# Patient Record
Sex: FEMALE | Race: WHITE | Employment: UNEMPLOYED | URBAN - METROPOLITAN AREA
[De-identification: names, ages, dates, MRNs, and addresses within clinical notes are randomized per-mention and may not be internally consistent; named-entity substitution may affect disease eponyms.]

---

## 2017-12-04 LAB
BLD GP AB SCN SERPL QL: NORMAL
HBV SURFACE AG SERPL QL IA: NEGATIVE
HIV 1+2 AB+HIV1 P24 AG SERPL QL IA: NEGATIVE
RUBELLA ANTIBODY IGG QUANTITATIVE: NORMAL IU/ML
T PALLIDUM IGG SER QL: NEGATIVE

## 2018-05-10 ENCOUNTER — ALLIED HEALTH/NURSE VISIT (OUTPATIENT)
Dept: EDUCATION SERVICES | Facility: CLINIC | Age: 38
End: 2018-05-10
Payer: COMMERCIAL

## 2018-05-10 DIAGNOSIS — O24.419 GESTATIONAL DIABETES: Primary | ICD-10-CM

## 2018-05-10 PROCEDURE — G0109 DIAB MANAGE TRN IND/GROUP: HCPCS

## 2018-05-10 NOTE — PROGRESS NOTES
Diabetes Self-Management Training - Gestational Diabetes    SUBJECTIVE/OBJECTIVE:  Juanita Quiroga presents today for education related to gestational diabetes.  She is accompanied by self    Patient's gestational diabetes management related comments/concerns: how big the baby is going to get    Patient's emotional response to diabetes: expresses readiness to learn    There were no vitals taken for this visit.    Pre pregnancy weight: 150#    Weight gain 15 lbs at 30 weeks gestation.    Estimated Date of Delivery: Data Unavailable    No results found for: GLC    3 hour OGTT: Fastin; 1 hr: 185; 2 hr: 199, 3 hr: 118 on 18    History   Smoking Status     Never Smoker   Smokeless Tobacco     Never Used       Lifestyle and Health Behaviors:  Physical Activity: minimal exercise. Walking 15 min per day.     Nutrition:  Patient eats 2 meals and 2 snacks per day.    Breakfast:  Eggs or cereal or fruit and granola bar  Snack:  Nuts or fruit  Lunch:  nothing  Snack:  nothing  Dinner:  Salad and sweet potatoes and rice  Bedtime Snack:  Tea with dried fruit    Other time(s) food is eaten? No       Beverages: soda sometimes, 1/2 c juice, milk, water    Cultural/Zoroastrian diet restrictions: No     Biggest challenge to healthy eating is:  knowing what to eat    Pre- Vitamin: Yes    Supplements: No   Experiencing nausea?  No     Socio/Economic considerations:  Support System: spouse/significant other    Health Beliefs and Attitudes:   Stage of Change: ACTION (Actively working towards change)    ASSESSMENT:  Needs assistance with meal plan and BG monitoring    INTERVENTION:  Patient was instructed on Contour Next EZ meter and was able to provide an accurate return demonstration. Patient's blood glucose reading today was 83 mg/dL.    Educational topics covered today:  GDM diagnosis, pathophysiology, Risks and Complications of GDM, Means of controlling GDM, Using a Blood Glucose Monitor, Blood Glucose Goals, Logging and  Interpreting Glucose Results, Ketone Testing, When to Call a Diabetes Educator or OB Provider, Healthy Eating During Pregnancy, Counting Carbohydrates, Meal Planning for GDM, and Physical Activity    Educational materials provided today:   [facility] Understanding Gestational Diabetes  GDM Log Book  Sharps Disposal  Care After Delivery  Contour Next EZ meter kit    Pt verbalized understanding of concepts discussed and recommendations provided today.     PLAN:  Check glucose 4 times daily, before breakfast and 1 hour after each meal.     Check Ketones daily for one week, if negative, reduce testing to once a week.     Physical activity recommended: as able.    Meal plan: 2-3 carbs at breakfast, 3-4 carbs at lunch, 3-4 carbs at supper, 1-2 carbs at 3 snacks a day.  Follow consistent CHO meal plan, eat CHO and protein/fat at all meals/snacks.    Call/e-mail/MyChart message diabetes educator if 3 or more blood sugars are above the goal in 1 week or if ketones are positive.     Call/e-mail/MyChart message with questions/concerns.    FOLLOW-UP:  Call or e-mail educator if 3 or more blood sugars are above goal in 1 week.  Call or e-mail with questions or concerns.  Appointment scheduled on 5/16.     Shaylee Mendez RD LD CDE    Time Spent: 120 minutes  Encounter type: Group class

## 2018-05-10 NOTE — MR AVS SNAPSHOT
"              After Visit Summary   5/10/2018    Juanita Quiroga    MRN: [id number]           Patient Information     Date Of Birth          1980        Visit Information        Provider Department      5/10/2018 1:00 PM CS GDM [facility]        Today's Diagnoses     Gestational diabetes    -  1       Follow-ups after your visit        Your next 10 appointments already scheduled     May 16, 2018 12:30 PM CDT   Diabetic Education with CS ENDO DIABETES ED RESOURCE   [facility] ([facility])    [address] [place] [id number] [phone]              Who to contact     If you have questions or need follow up information about today's clinic visit or your schedule please contact [facility] directly at [phone].  Normal or non-critical lab and imaging results will be communicated to you by MyChart, letter or phone within 4 business days after the clinic has received the results. If you do not hear from us within 7 days, please contact the clinic through MyChart or phone. If you have a critical or abnormal lab result, we will notify you by phone as soon as possible.  Submit refill requests through [employer] or call your pharmacy and they will forward the refill request to us. Please allow 3 business days for your refill to be completed.          Additional Information About Your Visit        [facility] Information     [employer] lets you send messages to your doctor, view your test results, renew your prescriptions, schedule appointments and more. To sign up, go to [email or web address] . Click on \"Log in\" on the left side of the screen, which will take you to the Welcome page. Then click on \"Sign up Now\" on the right side of the page.     You will be asked to enter the access code listed below, as well as some personal information. Please follow the directions to create your username and password.     Your access " code is: [email or web address]  Expires: 2018  3:40 PM     Your access code will  in 90 days. If you need help or a new code, please call your [place] clinic or [phone].        Care EveryWhere ID     This is your Care EveryWhere ID. This could be used by other organizations to access your [place] medical records  [id number]         Blood Pressure from Last 3 Encounters:   16 93/47   16 113/74    Weight from Last 3 Encounters:   16 78.9 kg (174 lb)              We Performed the Following     DIABETES EDUCATION - Group []           Today's Medication Changes          These changes are accurate as of 5/10/18  3:40 PM.  If you have any questions, ask your nurse or doctor.               Start taking these medicines.        Dose/Directions    acetone (Urine) test Strp   Used for:  Gestational diabetes        Test daily. When negative for 1 week can reduce testing to once weekly   Quantity:  25 each   Refills:  1       blood glucose monitoring lancets   Used for:  Gestational diabetes        Use to test blood sugar 4 times daily or as directed.  Ok to substitute alternative if insurance prefers.   Quantity:  100 each   Refills:  3       blood glucose monitoring meter device kit   Used for:  Gestational diabetes        Use to test blood sugar 4 times daily or as directed.  Ok to substitute alternative if insurance prefers.   Quantity:  1 kit   Refills:  0       blood glucose monitoring test strip   Commonly known as:  CONTOUR NEXT TEST   Used for:  Gestational diabetes        Use to test blood sugar 4 times daily or as directed.   Quantity:  150 each   Refills:  3            Where to get your medicines      These medications were sent to [facility] [address] [place] [address] [address]  AT [address] [facility] [address]     Phone:  [phone]     acetone (Urine) test Strp    blood glucose monitoring lancets    blood glucose  monitoring test strip         Some of these will need a paper prescription and others can be bought over the counter.  Ask your nurse if you have questions.     You don't need a prescription for these medications     blood glucose monitoring meter device kit                Primary Care Provider Office Phone # Fax #    ADRIA Hanna [phone]       [facility] [address]  [facility] [address]        Equal Access to Services     BASIL SORENSON : Hadii aad ku hadasho Soomaali, waaxda luqadaha, qaybta kaalmada adeegyada, waxay idiin hayaan adeeg kharash la'aan . [facility] [phone].    ATENCIÓN: Si habla español, tiene a miller disposición servicios gratuitos de asistencia lingüística. [facility] [phone].    We comply with applicable federal civil rights laws and [place] laws. We do not discriminate on the basis of race, color, national origin, age, disability, sex, sexual orientation, or gender identity.            Thank you!     Thank you for choosing [facility]  for your care. Our goal is always to provide you with excellent care. Hearing back from our patients is one way we can continue to improve our services. Please take a few minutes to complete the written survey that you may receive in the mail after your visit with us. Thank you!             Your Updated Medication List - Protect others around you: Learn how to safely use, store and throw away your medicines at www.disposemymeds.org.          This list is accurate as of 5/10/18  3:40 PM.  Always use your most recent med list.                   Brand Name Dispense Instructions for use Diagnosis    acetaminophen 325 MG tablet    TYLENOL    100 tablet    Take 2 tablets (650 mg) by mouth every 4 hours as needed for mild pain or fever (greater than or equal to 38? C /100.4? F (oral) or 38.5? C/ 101.4? F (core).)    Indication for care in labor or delivery       acetone (Urine) test Strp      25 each    Test daily. When negative for 1 week can reduce testing to once weekly    Gestational diabetes       blood glucose monitoring lancets     100 each    Use to test blood sugar 4 times daily or as directed.  Ok to substitute alternative if insurance prefers.    Gestational diabetes       blood glucose monitoring meter device kit     1 kit    Use to test blood sugar 4 times daily or as directed.  Ok to substitute alternative if insurance prefers.    Gestational diabetes       blood glucose monitoring test strip    CONTOUR NEXT TEST    150 each    Use to test blood sugar 4 times daily or as directed.    Gestational diabetes       calcium carbonate 500 MG chewable tablet    TUMS     Take 1 chew tab by mouth 3 times daily        ferrous sulfate 142 (45 Fe) MG Tbcr    SLO-FE    30 tablet    Take 1 tablet (142 mg) by mouth daily    Indication for care in labor or delivery       ibuprofen 200 MG tablet    ADVIL/MOTRIN     Take 3 tablets (600 mg) by mouth every 6 hours as needed for other (cramping)    Indication for care in labor or delivery

## 2018-05-14 ENCOUNTER — HOSPITAL ENCOUNTER (EMERGENCY)
Facility: CLINIC | Age: 38
Discharge: HOME | End: 2018-05-14
Attending: EMERGENCY MEDICINE | Admitting: EMERGENCY MEDICINE
Payer: COMMERCIAL

## 2018-05-14 ENCOUNTER — APPOINTMENT (OUTPATIENT)
Dept: ULTRASOUND IMAGING | Facility: CLINIC | Age: 38
End: 2018-05-14
Attending: EMERGENCY MEDICINE
Payer: COMMERCIAL

## 2018-05-14 ENCOUNTER — APPOINTMENT (OUTPATIENT)
Dept: GENERAL RADIOLOGY | Facility: CLINIC | Age: 38
End: 2018-05-14
Attending: EMERGENCY MEDICINE
Payer: COMMERCIAL

## 2018-05-14 VITALS
TEMPERATURE: 98.3 F | HEART RATE: 76 BPM | RESPIRATION RATE: 16 BRPM | SYSTOLIC BLOOD PRESSURE: 111 MMHG | OXYGEN SATURATION: 98 % | DIASTOLIC BLOOD PRESSURE: 85 MMHG

## 2018-05-14 DIAGNOSIS — Z33.1 PREGNANCY, INCIDENTAL: ICD-10-CM

## 2018-05-14 DIAGNOSIS — R07.89 CHEST WALL PAIN: ICD-10-CM

## 2018-05-14 LAB
ALBUMIN SERPL-MCNC: 2.8 G/DL (ref 3.4–5)
ALP SERPL-CCNC: 87 U/L (ref 40–150)
ALT SERPL W P-5'-P-CCNC: 19 U/L (ref 0–50)
ANION GAP SERPL CALCULATED.3IONS-SCNC: 8 MMOL/L (ref 3–14)
AST SERPL W P-5'-P-CCNC: 19 U/L (ref 0–45)
BASOPHILS # BLD AUTO: 0 10E9/L (ref 0–0.2)
BASOPHILS NFR BLD AUTO: 0.4 %
BILIRUB SERPL-MCNC: 0.3 MG/DL (ref 0.2–1.3)
BUN SERPL-MCNC: 8 MG/DL (ref 7–30)
CALCIUM SERPL-MCNC: 8.7 MG/DL (ref 8.5–10.1)
CHLORIDE SERPL-SCNC: 106 MMOL/L (ref 94–109)
CO2 SERPL-SCNC: 24 MMOL/L (ref 20–32)
CREAT SERPL-MCNC: 0.53 MG/DL (ref 0.52–1.04)
DEPRECATED S PYO AG THROAT QL EIA: NORMAL
DIFFERENTIAL METHOD BLD: ABNORMAL
EOSINOPHIL # BLD AUTO: 0.1 10E9/L (ref 0–0.7)
EOSINOPHIL NFR BLD AUTO: 1.2 %
ERYTHROCYTE [DISTWIDTH] IN BLOOD BY AUTOMATED COUNT: 11.9 % (ref 10–15)
GFR SERPL CREATININE-BSD FRML MDRD: >90 ML/MIN/1.7M2
GLUCOSE SERPL-MCNC: 74 MG/DL (ref 70–99)
HCT VFR BLD AUTO: 33.7 % (ref 35–47)
HGB BLD-MCNC: 11 G/DL (ref 11.7–15.7)
IMM GRANULOCYTES # BLD: 0.1 10E9/L (ref 0–0.4)
IMM GRANULOCYTES NFR BLD: 0.7 %
INR PPP: 0.9 (ref 0.86–1.14)
LIPASE SERPL-CCNC: 144 U/L (ref 73–393)
LYMPHOCYTES # BLD AUTO: 2.6 10E9/L (ref 0.8–5.3)
LYMPHOCYTES NFR BLD AUTO: 28.9 %
MCH RBC QN AUTO: 29 PG (ref 26.5–33)
MCHC RBC AUTO-ENTMCNC: 32.6 G/DL (ref 31.5–36.5)
MCV RBC AUTO: 89 FL (ref 78–100)
MONOCYTES # BLD AUTO: 0.6 10E9/L (ref 0–1.3)
MONOCYTES NFR BLD AUTO: 6.7 %
NEUTROPHILS # BLD AUTO: 5.7 10E9/L (ref 1.6–8.3)
NEUTROPHILS NFR BLD AUTO: 62.1 %
NRBC # BLD AUTO: 0 10*3/UL
NRBC BLD AUTO-RTO: 0 /100
PLATELET # BLD AUTO: 211 10E9/L (ref 150–450)
POTASSIUM SERPL-SCNC: 3.6 MMOL/L (ref 3.4–5.3)
PROT SERPL-MCNC: 7.2 G/DL (ref 6.8–8.8)
RBC # BLD AUTO: 3.79 10E12/L (ref 3.8–5.2)
SODIUM SERPL-SCNC: 138 MMOL/L (ref 133–144)
SPECIMEN SOURCE: NORMAL
TROPONIN I BLD-MCNC: 0 UG/L (ref 0–0.1)
TROPONIN I BLD-MCNC: 0 UG/L (ref 0–0.1)
WBC # BLD AUTO: 9.1 10E9/L (ref 4–11)

## 2018-05-14 PROCEDURE — 87081 CULTURE SCREEN ONLY: CPT | Performed by: EMERGENCY MEDICINE

## 2018-05-14 PROCEDURE — 93970 EXTREMITY STUDY: CPT

## 2018-05-14 PROCEDURE — 80053 COMPREHEN METABOLIC PANEL: CPT | Performed by: EMERGENCY MEDICINE

## 2018-05-14 PROCEDURE — 85610 PROTHROMBIN TIME: CPT | Performed by: EMERGENCY MEDICINE

## 2018-05-14 PROCEDURE — 83690 ASSAY OF LIPASE: CPT | Performed by: EMERGENCY MEDICINE

## 2018-05-14 PROCEDURE — 84484 ASSAY OF TROPONIN QUANT: CPT

## 2018-05-14 PROCEDURE — 87880 STREP A ASSAY W/OPTIC: CPT | Performed by: EMERGENCY MEDICINE

## 2018-05-14 PROCEDURE — 76705 ECHO EXAM OF ABDOMEN: CPT

## 2018-05-14 PROCEDURE — 99285 EMERGENCY DEPT VISIT HI MDM: CPT | Mod: 25

## 2018-05-14 PROCEDURE — 85025 COMPLETE CBC W/AUTO DIFF WBC: CPT | Performed by: EMERGENCY MEDICINE

## 2018-05-14 PROCEDURE — 71046 X-RAY EXAM CHEST 2 VIEWS: CPT

## 2018-05-14 ASSESSMENT — ENCOUNTER SYMPTOMS
FEVER: 0
SORE THROAT: 1
COUGH: 0

## 2018-05-14 NOTE — ED AVS SNAPSHOT
[facility] Emergency Department    [address]    [facility] [address]    Phone:  [phone]    Fax:  [phone]                                       Juanita Quiroga   MRN: [id number]    Department:  [facility] Emergency Department   Date of Visit:  5/14/2018           After Visit Summary Signature Page     I have received my discharge instructions, and my questions have been answered. I have discussed any challenges I see with this plan with the nurse or doctor.    ..........................................................................................................................................  Patient/Patient Representative Signature      ..........................................................................................................................................  Patient Representative Print Name and Relationship to Patient    ..................................................               ................................................  Date                                            Time    ..........................................................................................................................................  Reviewed by Signature/Title    ...................................................              ..............................................  Date                                                            Time

## 2018-05-14 NOTE — ED AVS SNAPSHOT
[facility] Emergency Department    [address] [place] [address]    Phone:  [phone]    Fax:  [phone]                                       Juanita Quiroga   MRN: [id number]    Department:  [facility] Emergency Department   Date of Visit:  5/14/2018           Patient Information     Date Of Birth          1980        Your diagnoses for this visit were:     Chest wall pain     Pregnancy, incidental        You were seen by Fidencio Barth MD.      Follow-up Information     Follow up with Nicole Henry APRN CNM. Schedule an appointment as soon as possible for a visit in 3 days.    Specialty:  Midwives    Contact information:    [facility]  [address]  [facility] [address]  [phone]          Discharge Instructions       Discharge Instructions  Chest Pain    You have been seen today for chest pain or discomfort.  At this time, your doctor has found no signs that your chest pain is due to a serious or life-threatening condition, (or you have declined more testing and/or admission to the hospital). However, sometimes there is a serious problem that does not show up right away. Your evaluation today may not be complete and you may need further testing and evaluation.     You need to follow-up with your regular doctor within 3 days.    Return to the Emergency Department if:    Your chest pain changes, gets worse, starts to happen more often, or comes with less activity.    You are short of breath.    You get very weak or tired.    You pass out or faint.    You have any new symptoms, like fever, cough, numb legs, or you cough up blood.    You have anything else that worries you.    Until you follow-up with your regular doctor please do the following:    Take one aspirin daily unless you have an allergy or are told not to by your doctor.    If a stress test appointment has been made, go to the appointment.    If you have  questions, contact your regular doctor.    If your doctor today has told you to follow-up with your regular doctor, it is very important that you make an appointment with your clinic and go to the appointment.  If you do not follow-up with your primary doctor, it may result in missing an important development which could result in permanent injury or disability and/or lasting pain.  If there is any problem keeping your appointment, call your doctor or return to the Emergency Department.    If you were given a prescription for medicine here today, be sure to read all of the information (including the package insert) that comes with your prescription.  This will include important information about the medicine, its side effects, and any warnings that you need to know about.  The pharmacist who fills the prescription can provide more information and answer questions you may have about the medicine.  If you have questions or concerns that the pharmacist cannot address, please call or return to the Emergency Department.     Opioid Medication Information    Pain medications are among the most commonly prescribed medicines, so we are including this information for all our patients. If you did not receive pain medication or get a prescription for pain medicine, you can ignore it.     You may have been given a prescription for an opioid (narcotic) pain medicine and/or have received a pain medicine while here in the Emergency Department. These medicines can make you drowsy or impaired. You must not drive, operate dangerous equipment, or engage in any other dangerous activities while taking these medications. If you drive while taking these medications, you could be arrested for DUI, or driving under the influence. Do not drink any alcohol while you are taking these medications.     Opioid pain medications can cause addiction. If you have a history of chemical dependency of any type, you are at a higher risk of becoming  addicted to pain medications.  Only take these prescribed medications to treat your pain when all other options have been tried. Take it for as short a time and as few doses as possible. Store your pain pills in a secure place, as they are frequently stolen and provide a dangerous opportunity for children or visitors in your house to start abusing these powerful medications. We will not replace any lost or stolen medicine.  As soon as your pain is better, you should flush all your remaining medication.     Many prescription pain medications contain Tylenol  (acetaminophen), including Vicodin , Tylenol #3 , Norco , Lortab , and Percocet .  You should not take any extra pills of Tylenol  if you are using these prescription medications or you can get very sick.  Do not ever take more than 3000 mg of acetaminophen in any 24 hour period.    All opioids tend to cause constipation. Drink plenty of water and eat foods that have a lot of fiber, such as fruits, vegetables, prune juice, apple juice and high fiber cereal.  Take a laxative if you don t move your bowels at least every other day. Miralax , Milk of Magnesia, Colace , or Senna  can be used to keep you regular.      Remember that you can always come back to the Emergency Department if you are not able to see your regular doctor in the amount of time listed above, if you get any new symptoms, or if there is anything that worries you.          Your next 10 appointments already scheduled     May 22, 2018 10:00 AM CDT   Diabetic Education with [facility] DIABETES ED RESOURCE   [facility] ([facility])    [address] [place] [id number] [phone]              24 Hour Appointment Hotline       To make an appointment at any [facility], call [phone] ([phone]). If you don't have a family doctor or clinic, we will help you find one. [facility] are conveniently located to serve the needs of you  and your family.             Review of your medicines      Our records show that you are taking the medicines listed below. If these are incorrect, please call your family doctor or clinic.        Dose / Directions Last dose taken    acetaminophen 325 MG tablet   Commonly known as:  TYLENOL   Dose:  650 mg   Quantity:  100 tablet        Take 2 tablets (650 mg) by mouth every 4 hours as needed for mild pain or fever (greater than or equal to 38? C /100.4? F (oral) or 38.5? C/ 101.4? F (core).)   Refills:  0        acetone (Urine) test Strp   Quantity:  25 each        Test daily. When negative for 1 week can reduce testing to once weekly   Refills:  1        blood glucose monitoring lancets   Quantity:  100 each        Use to test blood sugar 4 times daily or as directed.  Ok to substitute alternative if insurance prefers.   Refills:  3        blood glucose monitoring meter device kit   Quantity:  1 kit        Use to test blood sugar 4 times daily or as directed.  Ok to substitute alternative if insurance prefers.   Refills:  0        blood glucose monitoring test strip   Commonly known as:  CONTOUR NEXT TEST   Quantity:  150 each        Use to test blood sugar 4 times daily or as directed.   Refills:  3        calcium carbonate 500 MG chewable tablet   Commonly known as:  TUMS   Dose:  1 chew tab        Take 1 chew tab by mouth 3 times daily   Refills:  0        ferrous sulfate 142 (45 Fe) MG Tbcr   Commonly known as:  SLO-FE   Dose:  142 mg   Quantity:  30 tablet        Take 1 tablet (142 mg) by mouth daily   Refills:  0        ibuprofen 200 MG tablet   Commonly known as:  ADVIL/MOTRIN   Dose:  600 mg        Take 3 tablets (600 mg) by mouth every 6 hours as needed for other (cramping)   Refills:  0                Procedures and tests performed during your visit     Procedure/Test Number of Times Performed    Abdomen US, limited (RUQ only) 1    Beta strep group A culture 1    CBC with platelets differential 1     Chest XR,  PA & LAT 1    Comprehensive metabolic panel 1    EKG 12-lead, tracing only 1    INR 1    Lipase 1    Rapid strep screen 1    Troponin POCT 2    US Lower Extremity Venous Duplex Bilateral 1      Orders Needing Specimen Collection     None      Pending Results     Date and Time Order Name Status Description    5/14/2018 2032 Beta strep group A culture In process     5/14/2018 2015 Chest XR,  PA & LAT Preliminary     5/14/2018 1926 US Lower Extremity Venous Duplex Bilateral Preliminary     5/14/2018 1926 Abdomen US, limited (RUQ only) Preliminary     5/14/2018 1926 EKG 12-lead, tracing only Preliminary             Pending Culture Results     Date and Time Order Name Status Description    5/14/2018 2032 Beta strep group A culture In process             Pending Results Instructions     If you had any lab results that were not finalized at the time of your Discharge, you can call the ED Lab Result RN at [phone]. You will be contacted by this team for any positive Lab results or changes in treatment. The nurses are available 7 days a week from 10A to 6:30P.  You can leave a message 24 hours per day and they will return your call.        Test Results From Your Hospital Stay        5/14/2018  7:41 PM      Component Results     Component Value Ref Range & Units Status    WBC 9.1 4.0 - 11.0 10e9/L Final    RBC Count 3.79 (L) 3.8 - 5.2 10e12/L Final    Hemoglobin 11.0 (L) 11.7 - 15.7 g/dL Final    Hematocrit 33.7 (L) 35.0 - 47.0 % Final    MCV 89 78 - 100 fl Final    MCH 29.0 26.5 - 33.0 pg Final    MCHC 32.6 31.5 - 36.5 g/dL Final    RDW 11.9 10.0 - 15.0 % Final    Platelet Count 211 150 - 450 10e9/L Final    Diff Method Automated Method  Final    % Neutrophils 62.1 % Final    % Lymphocytes 28.9 % Final    % Monocytes 6.7 % Final    % Eosinophils 1.2 % Final    % Basophils 0.4 % Final    % Immature Granulocytes 0.7 % Final    Nucleated RBCs 0 0 /100 Final    Absolute Neutrophil 5.7 1.6 - 8.3 10e9/L Final     Absolute Lymphocytes 2.6 0.8 - 5.3 10e9/L Final    Absolute Monocytes 0.6 0.0 - 1.3 10e9/L Final    Absolute Eosinophils 0.1 0.0 - 0.7 10e9/L Final    Absolute Basophils 0.0 0.0 - 0.2 10e9/L Final    Abs Immature Granulocytes 0.1 0 - 0.4 10e9/L Final    Absolute Nucleated RBC 0.0  Final         5/14/2018  7:51 PM      Component Results     Component Value Ref Range & Units Status    INR 0.90 0.86 - 1.14 Final         5/14/2018  7:59 PM      Component Results     Component Value Ref Range & Units Status    Sodium 138 133 - 144 mmol/L Final    Potassium 3.6 3.4 - 5.3 mmol/L Final    Chloride 106 94 - 109 mmol/L Final    Carbon Dioxide 24 20 - 32 mmol/L Final    Anion Gap 8 3 - 14 mmol/L Final    Glucose 74 70 - 99 mg/dL Final    Urea Nitrogen 8 7 - 30 mg/dL Final    Creatinine 0.53 0.52 - 1.04 mg/dL Final    GFR Estimate >90 >60 mL/min/1.7m2 Final    Non  GFR Calc    GFR Estimate If Black >90 >60 mL/min/1.7m2 Final    African American GFR Calc    Calcium 8.7 8.5 - 10.1 mg/dL Final    Bilirubin Total 0.3 0.2 - 1.3 mg/dL Final    Albumin 2.8 (L) 3.4 - 5.0 g/dL Final    Protein Total 7.2 6.8 - 8.8 g/dL Final    Alkaline Phosphatase 87 40 - 150 U/L Final    ALT 19 0 - 50 U/L Final    AST 19 0 - 45 U/L Final         5/14/2018  7:59 PM      Component Results     Component Value Ref Range & Units Status    Lipase 144 73 - 393 U/L Final         5/14/2018  8:26 PM      Narrative     RIGHT UPPER QUADRANT ULTRASOUND  5/14/2018  8:21 PM    HISTORY: Epigastric abdominal pain, evaluate for gallstones.  Pregnancy.    COMPARISON: None.    FINDINGS:   Gallbladder: Normal with no cholelithiasis, wall thickening or focal  tenderness.     Bile ducts: CHD is normal diameter. No intrahepatic biliary  dilatation.    Liver: Normal.     Pancreas: Not well seen.    Right kidney: Normal.         Impression     IMPRESSION: Normal right upper quadrant ultrasound.         5/14/2018  8:22 PM      Narrative     ULTRASOUND VENOUS  LOWER EXTREMITY BILATERAL 5/14/2018 8:17 PM     HISTORY: Evaluate for deep vein thrombosis, recent flight, chest pain,  evaluate for deep vein thrombosis.      COMPARISON: None.    TECHNIQUE: Ultrasound gray scale, Color Doppler flow, and spectral  Doppler waveform analysis performed.    FINDINGS: The bilateral common femoral, superficial femoral, popliteal  and posterior tibial veins are patent and fully compressible and  demonstrate normal venous Doppler flow. The visualized greater  saphenous veins are negative for thrombus.        Impression     IMPRESSION: No DVT demonstrated.         5/14/2018  8:49 PM      Component Results     Component    Specimen Description    Throat    Rapid Strep A Screen    NEGATIVE: No Group A streptococcal antigen detected by immunoassay, await culture report.         5/14/2018  7:44 PM      Component Results     Component Value Ref Range & Units Status    Troponin I 0.00 0.00 - 0.10 ug/L Final         5/14/2018  8:40 PM      Narrative     CHEST TWO VIEWS 5/14/2018 8:27 PM     HISTORY: Chest pain.    COMPARISON: None.     FINDINGS: There are no acute infiltrates. The cardiac silhouette is  not enlarged. Pulmonary vasculature is unremarkable.        Impression     IMPRESSION: No acute disease.         5/14/2018  8:49 PM         5/14/2018  9:46 PM      Component Results     Component Value Ref Range & Units Status    Troponin I 0.00 0.00 - 0.10 ug/L Final                Clinical Quality Measure: Blood Pressure Screening     Your blood pressure was checked while you were in the emergency department today. The last reading we obtained was  BP: 106/72 . Please read the guidelines below about what these numbers mean and what you should do about them.  If your systolic blood pressure (the top number) is less than 120 and your diastolic blood pressure (the bottom number) is less than 80, then your blood pressure is normal. There is nothing more that you need to do about it.  If your systolic  "blood pressure (the top number) is 120-139 or your diastolic blood pressure (the bottom number) is 80-89, your blood pressure may be higher than it should be. You should have your blood pressure rechecked within a year by a primary care provider.  If your systolic blood pressure (the top number) is 140 or greater or your diastolic blood pressure (the bottom number) is 90 or greater, you may have high blood pressure. High blood pressure is treatable, but if left untreated over time it can put you at risk for heart attack, stroke, or kidney failure. You should have your blood pressure rechecked by a primary care provider within the next 4 weeks.  If your provider in the emergency department today gave you specific instructions to follow-up with your doctor or provider even sooner than that, you should follow that instruction and not wait for up to 4 weeks for your follow-up visit.        Thank you for choosing [facility]       Thank you for choosing [facility] for your care. Our goal is always to provide you with excellent care. Hearing back from our patients is one way we can continue to improve our services. Please take a few minutes to complete the written survey that you may receive in the mail after you visit with us. Thank you!        [employer] Information     [employer] lets you send messages to your doctor, view your test results, renew your prescriptions, schedule appointments and more. To sign up, go to [email or web address] . Click on \"Log in\" on the left side of the screen, which will take you to the Welcome page. Then click on \"Sign up Now\" on the right side of the page.     You will be asked to enter the access code listed below, as well as some personal information. Please follow the directions to create your username and password.     Your access code is: [email or web address]  Expires: 2018  3:40 PM     Your access code will  in 90 days. If you need help or a new code, please call your [facility] clinic or " [phone].        Care EveryWhere ID     This is your Care EveryWhere ID. This could be used by other organizations to access your [facility] medical records  [id number]        Equal Access to Services     BASIL SORENSON : Sebastien King, warei phillips, qaybta kagaryda lawrence, allie hayden. So [facility] [phone].    ATENCIÓN: Si habla español, tiene a miller disposición servicios gratuitos de asistencia lingüística. Llame al [phone].    We comply with applicable federal civil rights laws and [place] laws. We do not discriminate on the basis of race, color, national origin, age, disability, sex, sexual orientation, or gender identity.            After Visit Summary       This is your record. Keep this with you and show to your community pharmacist(s) and doctor(s) at your next visit.

## 2018-05-15 ENCOUNTER — TELEPHONE (OUTPATIENT)
Dept: EDUCATION SERVICES | Facility: CLINIC | Age: 38
End: 2018-05-15

## 2018-05-15 LAB — INTERPRETATION ECG - MUSE: NORMAL

## 2018-05-15 NOTE — ED PROVIDER NOTES
"  History     Chief Complaint:  Chest Pain    HPI   Juanita Quiroga is a 31 week pregnant with gestational diabetes,  37 year old female who presents with chest pain. The patient reports an onset of constant left lower chest pain, under her breast that began on Friday, 4 days ago. On  after taking a shower, her pain became sharp. She laid down, which made the pain subside but was still experiencing some pain that she described as \"something poking there\" when it is not sharp. Patient notes she has been lifting her toddler at home.She took 1 of acetaminophen that day. About 2 hours ago, patient was in the car and states her pain got worse again with sharpness. She also states that moving exacerbates the pain. She also notes that she's had a sore throat for 1 week that is almost resolved. Of note, patient had a trip to [place] 1 week ago. Denies cough, fever, vaginal bleeding. No recent traumas.     Allergies:  No known drug allergies      Medications:    Tums  Slo-FE    Past Medical History:    Gestational diabetes    Past Surgical History:    Appendectomy, open    Family History:    History reviewed. No pertinent family history.      Social History:  Smoking status: Never smoker  Alcohol use: Yes   Marital Status:  Single [1]  Significant other at bedside.     Review of Systems   Constitutional: Negative for fever.   HENT: Positive for sore throat.    Respiratory: Negative for cough.    Cardiovascular: Positive for chest pain.   Genitourinary: Negative for vaginal bleeding.   All other systems reviewed and are negative.    Physical Exam   /78  Pulse 76  Temp 98.3  F (36.8  C) (Oral)  Resp 16  SpO2 100%  Breastfeeding? No    /85  Pulse 76  Temp 98.3  F (36.8  C) (Oral)  Resp 16  SpO2 98%  Breastfeeding? No       Fetal heart tones 130    Physical Exam  General: The patient is alert, in no respiratory distress.    HENT: Mucous membranes moist.    Cardiovascular: Regular rate and rhythm. " Good pulses in all four extremities. Normal capillary refill and skin turgor.     Respiratory: Lungs are clear. No nasal flaring. No retractions. No wheezing, no crackles.    Gastrointestinal: Tenderness over the ribs and left upper quadrant. No guarding, no rebound. No palpable hernias.     Musculoskeletal: No gross deformity.     Skin: No rashes or petechiae.     Neurologic: The patient is alert and oriented x3. GCS 15. No testable cranial nerve deficit. Follows commands with clear and appropriate speech. Gives appropriate answers. Good strength in all extremities. No gross neurologic deficit. Gross sensation intact. Pupils are round and reactive. No meningismus.     Lymphatic: No cervical adenopathy. No lower extremity swelling.    Psychiatric: The patient is non-tearful.     Emergency Department Course   ECG (19:32:34)  Indication: Chest pain  Normal sinus rhythm. Normal ECG.   Read at 1934.   Rate 70 bpm. SD interval 152. QRS duration 76. QT/QTc 398/429. P-R-T axes 30 19 23.     Imaging:  Radiographic findings were communicated with the patient who voiced understanding of the findings.  Abdomen US limited (RUQ only)  Normal right upper quadrant ultrasound.  As read by Radiology.     US Lower Extremity Venous Duplex Bilateral  No DVT demonstrated.  As read by Radiology.     Chest XR, PA & LAT  No acute disease.  As read by Radiology.     Laboratory:  CBC: HGB 11.0 (L) WNL (WBC 9.1, )    CMP: Albumin 2.8 (L) WNL (Creatinine 0.53)   Lipase: 144  INR: 0.90  Troponin (1930): 0.00  Rapid strep: Negative.  Beta strep group A culture: Pending.  Blood culture: Pending.    Emergency Department Course:  Past medical records, nursing notes, and vitals reviewed.  1914: I performed an exam of the patient and obtained history, as documented above. I discussed obtaining D-dimer and CT. We'll hold on those.    1930: IV inserted and blood drawn for basic laboratory. Troponin obtained. Results as noted above.       1932:  ECG obtained, findings as noted above.      The patient was sent for a chest XR, abdomen US, and US lower extremity while in the emergency department, findings above.     2107: I rechecked the patient. Patient is stable.    2158: I discussed the case with , OB/GYN.     I rechecked the patient. Findings and plan explained to the Patient. Patient discharged home with instructions regarding supportive care, medications, and reasons to return. The importance of close follow-up was reviewed.      Impression & Plan    Medical Decision Making:  Juanita Quiroga is a 37 year old female the patient is pregnant at almost 31 weeks and reports she has been lifting her toddler up at home. There was some pain over her left chest and mild over the left abdomen. The left chest wall was mainly tender. There was no other areas of tenderness. She says it is very positional, if she gets in the right position the pain is ok. No vaginal bleeding. I did discuss the case with Dr. Arnold, of OB who did not feel she needed to go upstairs. There's no signs of pregnancy complication. Her pressures here have been fine. I think this is likely chest wall. I discussed performing a D-dimer to the patient having flown however she did not want to do that. Instead an ultrasound and X-ray was performed, she's aware of the risks for pregnancy. Dopplers were ordered of her legs and also her gallbladder which were all negative and she was discharged home with no signs of cardiac ischemia and will follow up with primary care doctor.     Diagnosis:    ICD-10-CM   1. Chest wall pain R07.89   2. Pregnancy, incidental Z33.1     Disposition:  discharged to home    Kalli Hicks  5/14/2018   [facility] EMERGENCY DEPARTMENT  I, Kalli Hicks, am serving as a scribe at 7:14 PM on 5/14/2018 to document services personally performed by Fidencio Barth MD based on my observations and the provider's statements to me.       Fidencio Barth,  MD  05/15/18 0014

## 2018-05-15 NOTE — ED TRIAGE NOTES
30 week old pregnant patient having pain under right breast rib area that started 4 days and for the past two hours has got worse. Pain is intermittent. ABC's intact.

## 2018-05-15 NOTE — TELEPHONE ENCOUNTER
Gestational Diabetes Follow-up    Subjective/Objective:    Juanita Quiroga sent in blood glucose log for review. Last date of communication was: 5/10/18.    Gestational diabetes is being managed with diet and activity    Taking diabetes medications: no    Estimated Date of Delivery: 2018    BG/Food Lo/11   *E2/E11 errors - used 4 different strips. Had to measure 2 hrs after breakfast as I couldn t figure out what I was doing wrong.  **2hrs after breakfast. 1hr after having juliet crackers and plain greek yogurt for snack.      *Cereal      *Steak, feta, cucumber+dried green apple for dinner the night before. Fresh pineapple for a snack      *Pistachios the night before  **Whole wheat bagel (skinny zesty breakfast from [employer])    05/15  *Soup (potatoes, carrots, beef) + 2 corn tortillas + 1 small apple  Ate fresh carrots before and after lunch.     I keep trying to figure it out, but I m confised why steak, pistachios and plain soup cause my sugar to go up.... what am I doing/seeing wrong?        Assessment:  One post breakfast reading is significantly elevated.  Called patient back requesting food records.  Records reveal no carbohydrate bedtime snacks which are consistent with ketones and fasting elevations.      Ketones:trace-small.   Fasting blood glucoses: 60% in target.  After breakfast: 60% in target.  After lunch: 83% in target.  After dinner: 100% in target.    Plan/Response:  30g bedtime snack  Whole grain non-cereal breakfast options  Send in readings 18    Scheduled CDE follow up 18    Ceci Hung MS, RD, LD, CDE      Any diabetes medication dose changes were made via the CDE Protocol and Collaborative Practice Agreement with the patient's OB/GYN provider. A copy of this encounter was shared with the provider.    E-mail reply to patient:  Max Grant,    Thanks for sending in all the information.  For the elevated fasting readings I think they may be due to not  having large enough of a bedtime snack.  Same reason you are seeing trace-small ketones.  If you don t eat enough then your body will compensate by having the liver sending out extra sugar into the blood stream which usually causes more of an elevated reading.  So I included a list of some recommended snack options to try at night.    For breakfast, many moms just can t tolerate cereal because of how finely ground the grains are.  I think the burggers breakfast would have been fine if you didn t start out so high.  I d recommend sticking to whole grain breads or starches for breakfast as your carbohydrate source (Whole wheat toast, English muffin, waffles etc.).    Depending on what your soup looked like sometimes they do add flour or corn starch to thicken the soup which adds in carbohydrates depending on the portion size.  If it was clear then it may have been the portion size of potatoes plus tortillas and the apple.      Give the bedtime snack a try and send in your readings again on Friday.    Let us know if you have any other questions!      Ceci Hung MS, RD, LD, CDE    Here are some ideas for breakfast or bedtime snack. Pick a carbohydrate from the right and a protein from the left. If you have carbohydrates 30 grams of total carbohydrate and 3-5 grams of fiber that is even better.   Fruits are not listed as they can cause the blood sugar to raise blood sugar quickly and be used up early in the night. Fruits are better at meals, or morning or afternoon snack.     Protein/Fat list (about 14 grams of protein or 2 fat servings) Carbohydrate list (about 30 grams of carbohydrate)   2 slices of cheese English Muffin   2 TBS Peanut butter/[place] butter/Sun butter 10 crackers     cup Cottage cheese 2% 2 pieces of toast   2 oz any cooked meat - less than deck of cards size 1 hamburger or hot dog bun   1.5 oz of soy nuts 1 whole wheat dany   Avocado or guacamole 6 juliet cracker squares     cup tuna - can add  mayonnaise 1 cup full fat ice cream - no candy or sauce   2 eggs - boiled, poached, fried, scrambled, omelet 30 chips   1 veggie asha (might have carbohydrates also) Greek yogurt - 30 grams of carbohydrate   2 TBS Coconut 2 - 6 inch tortillas corn or flour   12 shrimp - not breaded 2 toaster waffles - no syrup   2-1oz meatballs   bagel   2 Tbs cream cheese - plain, veggie, salmon - no fruit or honey. 6 cups popcorn - unsweetened     cup of nuts Granola bar of 3o grams of carbohydrate   10 olives 1 cup of unsweetened lentils or beans.    Tofu or Temph 4-5oz 1 cup potato salad     You can always add vegetables with dip, salad dressing or salsa also.

## 2018-05-15 NOTE — TELEPHONE ENCOUNTER
GDM BG report; * is 2 hour    Date Ket BB AB AL AD  5-10    83 128  11  87 99* 113 102  12 Trac 94 192 128 94  13 Trac 98 110 113 111  14 smal 110 143 90 122  15 smal 90 112 161

## 2018-05-16 LAB
BACTERIA SPEC CULT: NORMAL
SPECIMEN SOURCE: NORMAL

## 2018-05-22 ENCOUNTER — ALLIED HEALTH/NURSE VISIT (OUTPATIENT)
Dept: EDUCATION SERVICES | Facility: CLINIC | Age: 38
End: 2018-05-22
Payer: COMMERCIAL

## 2018-05-22 DIAGNOSIS — O24.419 GESTATIONAL DIABETES: Primary | ICD-10-CM

## 2018-05-22 PROCEDURE — G0108 DIAB MANAGE TRN  PER INDIV: HCPCS

## 2018-05-22 NOTE — PROGRESS NOTES
Gestational Diabetes Follow-up Visit    SUBJECTIVE/OBJECTIVE:  Juanita Quiroga presents today for education and evaluation of glucose control related to gestational diabetes    She is accompanied by self    Patient's gestational diabetes management related comments/concerns: elevated fasting numbers    There were no vitals taken for this visit.    Weight gain 15 lbs at 32 weeks gestation.    Blood Glucose/Ketone Log:    Date Ketones Fasting Post Breakfast Post Lunch Post Supper   5/22 trace 97 112     5/21 small 102 114 131 144   5/20 trace 97 128 112 103   5/19 mod 99 147* 122 112*   5/18 small 103 98* 124* 120   5/17 neg 106 105* 119 115   5/16 neg 100 111* 123 115*     Current gestational diabetes management:    Taking medications for gestational diabetes? No    Physical Activity: minimal exercise    Nutrition:  Patient eats 3 meals and 3 snacks per day. Including a HS snack lately.     Breakfast: greek yogurt and berries and protein bar (~12 grams of carb) and coffee  AM snack: berries or apple or protein bar  Lunch: zoodles with shrimp and chicken soup (1 c)   PM snack: almonds  Dinner: protein (steak or salmon or chicken wings) and salad (cucumbers, tomatoes)  HS snack: Greek yogurt and protein bar     ASSESSMENT:  Continues to struggle with elevated fasting numbers. However, consuming very minimal carbs at lunch, dinner, and afternoon snack.     Health Beliefs and Attitudes:   Stage of Change: ACTION (Actively working towards change)    INTERVENTION:  Educational topics covered today:  What to expect after delivery, Future testing for Type 2 diabetes (2 hour OGTT at 6 week post-partum check-up and annual fasting blood glucose level), Risk of GDM and planning ahead for future pregnancies, Recommended lifestyle interventions for reducing the risk of Type 2 Diabetes, When to Call a Diabetes Educator or OB Provider    Reviewed importance of not eliminating carbs from diet.  Explained that her elevated fasting  numbers are likely r/t the increased insulin resistance in the morning during pregnancy. She is anxious about possibly needing insulin. Explained the role of insulin and that if it is needed her OB clinic will refer her to an endo clinic.     Educational Materials provided today:  [facility] Preventing Diabetes    PLAN:  Check glucose 4 times daily.  Check ketones once a week when readings are consistently negative.  Continue with recommended physical activity.  Continue to follow recommended meal plan: 2-3 carbs at breakfast, 3-4 carbs at lunch, 3-4 carbs at supper, 1-2 carbs at snacks.  Follow consistent CHO meal plan, eat CHO and protein/fat at all meals/snacks.  If her fasting blood sugars remain elevated the next couple days despite modifying her diet, recommend to begin bedtime insulin.     Call/e-mail/MyChart message diabetes educator if 3 or more blood sugars are above the goal in 1 week or if ketones are positive.     FOLLOW-UP:  Follow up with diabetes educator in 3 days on May 25 after checking BG in the morning.   Call or e-mail with questions or concerns.    Call/e-mail/MyChart message diabetes educator if 3 or more blood sugars are above the goal in 1 week or if ketones are positive.     Shaylee Mendez RD LD CDE    Time spent was 30 minutes  Encounter type: Individual  .

## 2018-05-22 NOTE — PATIENT INSTRUCTIONS
1. Check glucose 4 times daily, before breakfast daily and 1 hour after each meal, as recommended.    2. Check ketones daily or once a week after they have been negative for 7 days in a row. If ketones are positive, let your diabetes educator know and continue to check daily until they are negative for 7 days in a row.    3. Continue with recommended physical activity.    4. Continue to follow recommended meal plan: 2-3 carbs at breakfast, 3-4 carbs at lunch, 3-4 carbs at supper, 1-2 carbs at snacks.  Follow consistent CHO meal plan, eat CHO and protein/fat at all meals/snacks.  **Add some carbohydrates at lunch and dinner. Try to aim for 45 grams of carb for each of these meals. This could be 2 T dried fruit, 8 oz milk, and 1 small dany.     5. Follow-up with OB doctor as recommended.    6. Call/e-mail diabetes educator if 3 or more blood sugars are above the goal in 1 week or if ketones are positive.     **Email us your numbers on Friday May 25th in the morning.       AFTER YOU DELIVER:  - Continue with healthy eating and physical activity to get back to your pre-pregnancy weight.   - Have a follow-up 2-hour Glucose Tolerance Test at your 6-week post-partum check-up.   - Have your fasting blood sugar checked once a year.  - Plan ahead for future pregnancies - eat healthy, keep active, work with your doctor to check for gestational diabetes early on in the pregnancy and check blood sugars as recommended by your doctor.

## 2018-05-22 NOTE — Clinical Note
FYI, she will be emailing her numbers in on Friday. Likely will need insulin but she sees Samantha KRAUS so would just need to update her clinic.  Told her to send in her numbers in the morning so we can update them right away if needed.  Shaylee Mendez, PRATEEK LD CDE

## 2018-05-25 ENCOUNTER — TELEPHONE (OUTPATIENT)
Dept: EDUCATION SERVICES | Facility: CLINIC | Age: 38
End: 2018-05-25

## 2018-05-25 NOTE — TELEPHONE ENCOUNTER
Patient was seen for GDM ed on 5/22.   Was to send in numbers update today but has not emailed yet.     I called patient, got voice mail, left general message- calling from [facility] to follow up on her visit with Shaylee Tuesday.   Can she please send in a photo of her yellow book today? Or this weekend so we can look Tuesday.    Note she had 100% high fasting numbers on 5/22, is a [facility] patient who will need referral to endo if insulin is needed.     Eveline Cutler RD, LD, CDE

## 2018-06-19 LAB — GROUP B STREP PCR: NEGATIVE

## 2018-07-06 RX ORDER — OXYTOCIN/0.9 % SODIUM CHLORIDE 30/500 ML
1-24 PLASTIC BAG, INJECTION (ML) INTRAVENOUS CONTINUOUS
Status: CANCELLED | OUTPATIENT
Start: 2018-07-06

## 2018-07-06 RX ORDER — LIDOCAINE 40 MG/G
CREAM TOPICAL
Status: CANCELLED | OUTPATIENT
Start: 2018-07-06

## 2018-07-07 ENCOUNTER — HOSPITAL ENCOUNTER (INPATIENT)
Facility: CLINIC | Age: 38
LOS: 1 days | Discharge: HOME | End: 2018-07-08
Attending: REGISTERED NURSE | Admitting: REGISTERED NURSE
Payer: COMMERCIAL

## 2018-07-07 ENCOUNTER — ANESTHESIA (OUTPATIENT)
Dept: OBGYN | Facility: CLINIC | Age: 38
End: 2018-07-07
Payer: COMMERCIAL

## 2018-07-07 ENCOUNTER — ANESTHESIA EVENT (OUTPATIENT)
Dept: OBGYN | Facility: CLINIC | Age: 38
End: 2018-07-07
Payer: COMMERCIAL

## 2018-07-07 PROBLEM — Z34.90 PREGNANCY: Status: ACTIVE | Noted: 2018-07-07

## 2018-07-07 LAB
ABO + RH BLD: NORMAL
ABO + RH BLD: NORMAL
BASOPHILS # BLD AUTO: 0 10E9/L (ref 0–0.2)
BASOPHILS NFR BLD AUTO: 0.3 %
DIFFERENTIAL METHOD BLD: ABNORMAL
EOSINOPHIL # BLD AUTO: 0.1 10E9/L (ref 0–0.7)
EOSINOPHIL NFR BLD AUTO: 1.4 %
ERYTHROCYTE [DISTWIDTH] IN BLOOD BY AUTOMATED COUNT: 13.2 % (ref 10–15)
GLUCOSE BLDC GLUCOMTR-MCNC: 79 MG/DL (ref 70–99)
GLUCOSE BLDC GLUCOMTR-MCNC: 80 MG/DL (ref 70–99)
GLUCOSE BLDC GLUCOMTR-MCNC: 88 MG/DL (ref 70–99)
GLUCOSE BLDC GLUCOMTR-MCNC: 89 MG/DL (ref 70–99)
HCT VFR BLD AUTO: 31.9 % (ref 35–47)
HGB BLD-MCNC: 10.2 G/DL (ref 11.7–15.7)
IMM GRANULOCYTES # BLD: 0 10E9/L (ref 0–0.4)
IMM GRANULOCYTES NFR BLD: 0.4 %
LYMPHOCYTES # BLD AUTO: 2.3 10E9/L (ref 0.8–5.3)
LYMPHOCYTES NFR BLD AUTO: 29.8 %
MCH RBC QN AUTO: 26 PG (ref 26.5–33)
MCHC RBC AUTO-ENTMCNC: 32 G/DL (ref 31.5–36.5)
MCV RBC AUTO: 81 FL (ref 78–100)
MONOCYTES # BLD AUTO: 0.6 10E9/L (ref 0–1.3)
MONOCYTES NFR BLD AUTO: 7.2 %
NEUTROPHILS # BLD AUTO: 4.7 10E9/L (ref 1.6–8.3)
NEUTROPHILS NFR BLD AUTO: 60.9 %
NRBC # BLD AUTO: 0 10*3/UL
NRBC BLD AUTO-RTO: 0 /100
PLATELET # BLD AUTO: 156 10E9/L (ref 150–450)
RBC # BLD AUTO: 3.92 10E12/L (ref 3.8–5.2)
SPECIMEN EXP DATE BLD: NORMAL
T PALLIDUM AB SER QL: NONREACTIVE
WBC # BLD AUTO: 7.7 10E9/L (ref 4–11)

## 2018-07-07 PROCEDURE — 12000037 ZZH R&B POSTPARTUM INTERMEDIATE

## 2018-07-07 PROCEDURE — 86900 BLOOD TYPING SEROLOGIC ABO: CPT | Performed by: REGISTERED NURSE

## 2018-07-07 PROCEDURE — 25000125 ZZHC RX 250: Performed by: ANESTHESIOLOGY

## 2018-07-07 PROCEDURE — 86901 BLOOD TYPING SEROLOGIC RH(D): CPT | Performed by: REGISTERED NURSE

## 2018-07-07 PROCEDURE — 0KQM0ZZ REPAIR PERINEUM MUSCLE, OPEN APPROACH: ICD-10-PCS | Performed by: REGISTERED NURSE

## 2018-07-07 PROCEDURE — 25000132 ZZH RX MED GY IP 250 OP 250 PS 637: Performed by: REGISTERED NURSE

## 2018-07-07 PROCEDURE — 85025 COMPLETE CBC W/AUTO DIFF WBC: CPT | Performed by: REGISTERED NURSE

## 2018-07-07 PROCEDURE — 72200001 ZZH LABOR CARE VAGINAL DELIVERY SINGLE

## 2018-07-07 PROCEDURE — 10907ZC DRAINAGE OF AMNIOTIC FLUID, THERAPEUTIC FROM PRODUCTS OF CONCEPTION, VIA NATURAL OR ARTIFICIAL OPENING: ICD-10-PCS | Performed by: REGISTERED NURSE

## 2018-07-07 PROCEDURE — 3E033VJ INTRODUCTION OF OTHER HORMONE INTO PERIPHERAL VEIN, PERCUTANEOUS APPROACH: ICD-10-PCS | Performed by: REGISTERED NURSE

## 2018-07-07 PROCEDURE — 86780 TREPONEMA PALLIDUM: CPT | Performed by: REGISTERED NURSE

## 2018-07-07 PROCEDURE — 25000128 H RX IP 250 OP 636: Performed by: ANESTHESIOLOGY

## 2018-07-07 PROCEDURE — 27110038 ZZH RX 271: Performed by: ANESTHESIOLOGY

## 2018-07-07 PROCEDURE — 00HU33Z INSERTION OF INFUSION DEVICE INTO SPINAL CANAL, PERCUTANEOUS APPROACH: ICD-10-PCS | Performed by: ANESTHESIOLOGY

## 2018-07-07 PROCEDURE — 25000128 H RX IP 250 OP 636: Performed by: REGISTERED NURSE

## 2018-07-07 PROCEDURE — 25000125 ZZHC RX 250: Performed by: REGISTERED NURSE

## 2018-07-07 PROCEDURE — 36415 COLL VENOUS BLD VENIPUNCTURE: CPT | Performed by: REGISTERED NURSE

## 2018-07-07 PROCEDURE — 3E0R3BZ INTRODUCTION OF ANESTHETIC AGENT INTO SPINAL CANAL, PERCUTANEOUS APPROACH: ICD-10-PCS | Performed by: ANESTHESIOLOGY

## 2018-07-07 PROCEDURE — 37000011 ZZH ANESTHESIA WARD SERVICE

## 2018-07-07 PROCEDURE — 00000146 ZZHCL STATISTIC GLUCOSE BY METER IP

## 2018-07-07 RX ORDER — AMOXICILLIN 250 MG
2 CAPSULE ORAL 2 TIMES DAILY
Status: DISCONTINUED | OUTPATIENT
Start: 2018-07-07 | End: 2018-07-08 | Stop reason: HOSPADM

## 2018-07-07 RX ORDER — NALOXONE HYDROCHLORIDE 0.4 MG/ML
.1-.4 INJECTION, SOLUTION INTRAMUSCULAR; INTRAVENOUS; SUBCUTANEOUS
Status: DISCONTINUED | OUTPATIENT
Start: 2018-07-07 | End: 2018-07-07

## 2018-07-07 RX ORDER — OXYCODONE AND ACETAMINOPHEN 5; 325 MG/1; MG/1
1 TABLET ORAL
Status: DISCONTINUED | OUTPATIENT
Start: 2018-07-07 | End: 2018-07-07

## 2018-07-07 RX ORDER — ACETAMINOPHEN 325 MG/1
650 TABLET ORAL EVERY 4 HOURS PRN
Status: DISCONTINUED | OUTPATIENT
Start: 2018-07-07 | End: 2018-07-08 | Stop reason: HOSPADM

## 2018-07-07 RX ORDER — LIDOCAINE HYDROCHLORIDE AND EPINEPHRINE 15; 5 MG/ML; UG/ML
INJECTION, SOLUTION EPIDURAL PRN
Status: DISCONTINUED | OUTPATIENT
Start: 2018-07-07 | End: 2018-07-08 | Stop reason: HOSPADM

## 2018-07-07 RX ORDER — HYDROCORTISONE 2.5 %
CREAM (GRAM) TOPICAL 3 TIMES DAILY PRN
Status: DISCONTINUED | OUTPATIENT
Start: 2018-07-07 | End: 2018-07-08 | Stop reason: HOSPADM

## 2018-07-07 RX ORDER — OXYTOCIN/0.9 % SODIUM CHLORIDE 30/500 ML
340 PLASTIC BAG, INJECTION (ML) INTRAVENOUS CONTINUOUS PRN
Status: DISCONTINUED | OUTPATIENT
Start: 2018-07-07 | End: 2018-07-08 | Stop reason: HOSPADM

## 2018-07-07 RX ORDER — SODIUM CHLORIDE 9 MG/ML
INJECTION, SOLUTION INTRAVENOUS CONTINUOUS
Status: DISCONTINUED | OUTPATIENT
Start: 2018-07-07 | End: 2018-07-07

## 2018-07-07 RX ORDER — ROPIVACAINE HYDROCHLORIDE 2 MG/ML
10 INJECTION, SOLUTION EPIDURAL; INFILTRATION; PERINEURAL ONCE
Status: COMPLETED | OUTPATIENT
Start: 2018-07-07 | End: 2018-07-07

## 2018-07-07 RX ORDER — BISACODYL 10 MG
10 SUPPOSITORY, RECTAL RECTAL DAILY PRN
Status: DISCONTINUED | OUTPATIENT
Start: 2018-07-09 | End: 2018-07-08 | Stop reason: HOSPADM

## 2018-07-07 RX ORDER — NICOTINE POLACRILEX 4 MG
15-30 LOZENGE BUCCAL
Status: DISCONTINUED | OUTPATIENT
Start: 2018-07-07 | End: 2018-07-07

## 2018-07-07 RX ORDER — ONDANSETRON 2 MG/ML
4 INJECTION INTRAMUSCULAR; INTRAVENOUS EVERY 6 HOURS PRN
Status: DISCONTINUED | OUTPATIENT
Start: 2018-07-07 | End: 2018-07-07

## 2018-07-07 RX ORDER — OXYTOCIN 10 [USP'U]/ML
10 INJECTION, SOLUTION INTRAMUSCULAR; INTRAVENOUS
Status: DISCONTINUED | OUTPATIENT
Start: 2018-07-07 | End: 2018-07-08 | Stop reason: HOSPADM

## 2018-07-07 RX ORDER — OXYTOCIN 10 [USP'U]/ML
10 INJECTION, SOLUTION INTRAMUSCULAR; INTRAVENOUS
Status: DISCONTINUED | OUTPATIENT
Start: 2018-07-07 | End: 2018-07-07

## 2018-07-07 RX ORDER — ACETAMINOPHEN 325 MG/1
650 TABLET ORAL EVERY 4 HOURS PRN
Status: DISCONTINUED | OUTPATIENT
Start: 2018-07-07 | End: 2018-07-07

## 2018-07-07 RX ORDER — MISOPROSTOL 200 UG/1
400 TABLET ORAL
Status: DISCONTINUED | OUTPATIENT
Start: 2018-07-07 | End: 2018-07-08 | Stop reason: HOSPADM

## 2018-07-07 RX ORDER — LIDOCAINE 40 MG/G
CREAM TOPICAL
Status: DISCONTINUED | OUTPATIENT
Start: 2018-07-07 | End: 2018-07-07

## 2018-07-07 RX ORDER — DEXTROSE MONOHYDRATE 25 G/50ML
25-50 INJECTION, SOLUTION INTRAVENOUS
Status: DISCONTINUED | OUTPATIENT
Start: 2018-07-07 | End: 2018-07-07

## 2018-07-07 RX ORDER — SODIUM CHLORIDE, SODIUM LACTATE, POTASSIUM CHLORIDE, CALCIUM CHLORIDE 600; 310; 30; 20 MG/100ML; MG/100ML; MG/100ML; MG/100ML
INJECTION, SOLUTION INTRAVENOUS CONTINUOUS
Status: DISCONTINUED | OUTPATIENT
Start: 2018-07-07 | End: 2018-07-07

## 2018-07-07 RX ORDER — OXYTOCIN/0.9 % SODIUM CHLORIDE 30/500 ML
100 PLASTIC BAG, INJECTION (ML) INTRAVENOUS CONTINUOUS
Status: DISCONTINUED | OUTPATIENT
Start: 2018-07-07 | End: 2018-07-08 | Stop reason: HOSPADM

## 2018-07-07 RX ORDER — METHYLERGONOVINE MALEATE 0.2 MG/ML
200 INJECTION INTRAVENOUS
Status: DISCONTINUED | OUTPATIENT
Start: 2018-07-07 | End: 2018-07-07

## 2018-07-07 RX ORDER — AMOXICILLIN 250 MG
1 CAPSULE ORAL 2 TIMES DAILY
Status: DISCONTINUED | OUTPATIENT
Start: 2018-07-07 | End: 2018-07-08 | Stop reason: HOSPADM

## 2018-07-07 RX ORDER — EPHEDRINE SULFATE 50 MG/ML
5 INJECTION, SOLUTION INTRAMUSCULAR; INTRAVENOUS; SUBCUTANEOUS
Status: DISCONTINUED | OUTPATIENT
Start: 2018-07-07 | End: 2018-07-07

## 2018-07-07 RX ORDER — CARBOPROST TROMETHAMINE 250 UG/ML
250 INJECTION, SOLUTION INTRAMUSCULAR
Status: DISCONTINUED | OUTPATIENT
Start: 2018-07-07 | End: 2018-07-07

## 2018-07-07 RX ORDER — NALOXONE HYDROCHLORIDE 0.4 MG/ML
.1-.4 INJECTION, SOLUTION INTRAMUSCULAR; INTRAVENOUS; SUBCUTANEOUS
Status: DISCONTINUED | OUTPATIENT
Start: 2018-07-07 | End: 2018-07-08 | Stop reason: HOSPADM

## 2018-07-07 RX ORDER — LANOLIN 100 %
OINTMENT (GRAM) TOPICAL
Status: DISCONTINUED | OUTPATIENT
Start: 2018-07-07 | End: 2018-07-08 | Stop reason: HOSPADM

## 2018-07-07 RX ORDER — IBUPROFEN 400 MG/1
800 TABLET, FILM COATED ORAL EVERY 6 HOURS PRN
Status: DISCONTINUED | OUTPATIENT
Start: 2018-07-07 | End: 2018-07-08 | Stop reason: HOSPADM

## 2018-07-07 RX ORDER — OXYTOCIN/0.9 % SODIUM CHLORIDE 30/500 ML
100-340 PLASTIC BAG, INJECTION (ML) INTRAVENOUS CONTINUOUS PRN
Status: COMPLETED | OUTPATIENT
Start: 2018-07-07 | End: 2018-07-07

## 2018-07-07 RX ORDER — NALBUPHINE HYDROCHLORIDE 10 MG/ML
2.5-5 INJECTION, SOLUTION INTRAMUSCULAR; INTRAVENOUS; SUBCUTANEOUS EVERY 6 HOURS PRN
Status: DISCONTINUED | OUTPATIENT
Start: 2018-07-07 | End: 2018-07-07

## 2018-07-07 RX ORDER — IBUPROFEN 400 MG/1
800 TABLET, FILM COATED ORAL
Status: DISCONTINUED | OUTPATIENT
Start: 2018-07-07 | End: 2018-07-07

## 2018-07-07 RX ORDER — OXYTOCIN/0.9 % SODIUM CHLORIDE 30/500 ML
1-24 PLASTIC BAG, INJECTION (ML) INTRAVENOUS CONTINUOUS
Status: DISCONTINUED | OUTPATIENT
Start: 2018-07-07 | End: 2018-07-07

## 2018-07-07 RX ORDER — DEXTROSE, SODIUM CHLORIDE, SODIUM LACTATE, POTASSIUM CHLORIDE, AND CALCIUM CHLORIDE 5; .6; .31; .03; .02 G/100ML; G/100ML; G/100ML; G/100ML; G/100ML
INJECTION, SOLUTION INTRAVENOUS CONTINUOUS
Status: DISCONTINUED | OUTPATIENT
Start: 2018-07-07 | End: 2018-07-07

## 2018-07-07 RX ADMIN — SODIUM CHLORIDE, POTASSIUM CHLORIDE, SODIUM LACTATE AND CALCIUM CHLORIDE: 600; 310; 30; 20 INJECTION, SOLUTION INTRAVENOUS at 08:45

## 2018-07-07 RX ADMIN — SODIUM CHLORIDE, SODIUM LACTATE, POTASSIUM CHLORIDE, CALCIUM CHLORIDE AND DEXTROSE MONOHYDRATE: 5; 600; 310; 30; 20 INJECTION, SOLUTION INTRAVENOUS at 14:25

## 2018-07-07 RX ADMIN — OXYTOCIN-SODIUM CHLORIDE 0.9% IV SOLN 30 UNIT/500ML 100 ML/HR: 30-0.9/5 SOLUTION at 17:00

## 2018-07-07 RX ADMIN — SODIUM CHLORIDE, POTASSIUM CHLORIDE, SODIUM LACTATE AND CALCIUM CHLORIDE 1000 ML: 600; 310; 30; 20 INJECTION, SOLUTION INTRAVENOUS at 13:35

## 2018-07-07 RX ADMIN — Medication 12 ML/HR: at 14:06

## 2018-07-07 RX ADMIN — ROPIVACAINE HYDROCHLORIDE 10 ML: 2 INJECTION, SOLUTION EPIDURAL; INFILTRATION at 13:43

## 2018-07-07 RX ADMIN — ACETAMINOPHEN 650 MG: 325 TABLET, FILM COATED ORAL at 17:39

## 2018-07-07 RX ADMIN — SENNOSIDES AND DOCUSATE SODIUM 1 TABLET: 8.6; 5 TABLET ORAL at 20:29

## 2018-07-07 RX ADMIN — LIDOCAINE HYDROCHLORIDE AND EPINEPHRINE 3 ML: 15; 5 INJECTION, SOLUTION EPIDURAL at 13:37

## 2018-07-07 RX ADMIN — IBUPROFEN 800 MG: 400 TABLET ORAL at 17:39

## 2018-07-07 RX ADMIN — OXYTOCIN-SODIUM CHLORIDE 0.9% IV SOLN 30 UNIT/500ML 340 ML/HR: 30-0.9/5 SOLUTION at 16:31

## 2018-07-07 RX ADMIN — SODIUM CHLORIDE, POTASSIUM CHLORIDE, SODIUM LACTATE AND CALCIUM CHLORIDE: 600; 310; 30; 20 INJECTION, SOLUTION INTRAVENOUS at 13:44

## 2018-07-07 RX ADMIN — OXYTOCIN-SODIUM CHLORIDE 0.9% IV SOLN 30 UNIT/500ML 2 MILLI-UNITS/MIN: 30-0.9/5 SOLUTION at 08:55

## 2018-07-07 NOTE — PLAN OF CARE
Problem: Labor (Cervical Ripen, Induct, Augment) (Adult,Obstetrics,Pediatric)  Goal: Signs and Symptoms of Listed Potential Problems Will be Absent, Minimized or Managed (Labor)  Signs and symptoms of listed potential problems will be absent, minimized or managed by discharge/transition of care (reference Labor (Cervical Ripen, Induct, Augment) (Adult,Obstetrics,Pediatric) C[facility]).   Outcome: No Change  Patient in active labor 8-9 cm, -1, 95%. Tim every 2-3 minutes, on 10 mu of pitocin, FHTs reassuring. BG 89 at 1500 has not needed any insulin during labor so far today.

## 2018-07-07 NOTE — IP AVS SNAPSHOT
MRN:[id number]                      After Visit Summary   7/7/2018    Juanita Quiroga    MRN: [id number]           Thank you!     Thank you for choosing [facility] for your care. Our goal is always to provide you with excellent care. Hearing back from our patients is one way we can continue to improve our services. Please take a few minutes to complete the written survey that you may receive in the mail after you visit with us. Thank you!        Patient Information     Date Of Birth          1980        About your hospital stay     You were admitted on:  July 7, 2018 You last received care in the:  [address]    You were discharged on:  July 8, 2018        Reason for your hospital stay       Maternity care                  Who to Call     For medical emergencies, please call 911.  For non-urgent questions about your medical care, please call your primary care provider or clinic, [phone]          Attending Provider     Provider Specialty    Nicole Henry APRN CN[facility]       Primary Care Provider Office Phone # Fax #    ADRIA Hanna [phone]      After Care Instructions     Activity       Review discharge instructions            Diet       Resume previous diet            Discharge Instructions - Gestational diabetic patients       Gestational diabetic patients to follow up for fasting blood sugar and 2 hour 75gm glucose load at 6 weeks postpartum.            Discharge Instructions - Postpartum visit       Schedule postpartum visit with your provider and return to clinic in 6 weeks for fasting glucose and 2hr gtt. She may have a 3 week visit in addition to 6 week visit if desired.                  Follow-up Appointments     Follow Up and recommended labs and tests       F/U in office in 6 weeks for pp visit and 2hr Gtt                  Further instructions from your care team       Postpartum Vaginal Delivery  Instructions    Activity       Ask family and friends for help when you need it.    Do not place anything in your vagina for 6 weeks.    You are not restricted on other activities, but take it easy for a few weeks to allow your body to recover from delivery.  You are able to do any activities you feel up to that point.    No driving until you have stopped taking your pain medications (usually two weeks after delivery).     Call your health care provider if you have any of these symptoms:       Increased pain, swelling, redness, or fluid around your stiches from an episiotomy or perineal tear.    A fever above 100.4 F (38 C) with or without chills when placing a thermometer under your tongue.    You soak a sanitary pad with blood within 1 hour, or you see blood clots larger than a golf ball.    Bleeding that lasts more than 6 weeks.    Vaginal discharge that smells bad.    Severe pain, cramping or tenderness in your lower belly area.    A need to urinate more frequently (use the toilet more often), more urgently (use the toilet very quickly), or it burns when you urinate.    Nausea and vomiting.    Redness, swelling or pain around a vein in your leg.    Problems breastfeeding or a red or painful area on your breast.    Chest pain and cough or are gasping for air.    Problems coping with sadness, anxiety, or depression.  If you have any concerns about hurting yourself or the baby, call your provider immediately.     You have questions or concerns after you return home.     Keep your hands clean:  Always wash your hands before touching your perineal area and stitches.  This helps reduce your risk of infection.  If your hands aren't dirty, you may use an alcohol hand-rub to clean your hands. Keep your nails clean and short.        Pending Results     No orders found for last 3 day(s).            Statement of Approval     Ordered          07/08/18 0831  I have reviewed and agree with all the recommendations and orders  "detailed in this document.  EFFECTIVE NOW     Approved and electronically signed by:  Nicole Henry APRN CNM             Admission Information     Date & Time Provider Department Dept. Phone    2018 Nicole Henry APRN CNM [address] [phone]      Your Vitals Were     Blood Pressure Pulse Temperature Respirations Height Weight    108/67 64 98.5  F (36.9  C) (Oral) 16 1.702 m (5' 7\") 75.8 kg (167 lb)    Pulse Oximetry BMI (Body Mass Index)                100% 26.16 kg/m2          MyChart Information     [employer] lets you send messages to your doctor, view your test results, renew your prescriptions, schedule appointments and more. To sign up, go to [email or web address] . Click on \"Log in\" on the left side of the screen, which will take you to the Welcome page. Then click on \"Sign up Now\" on the right side of the page.     You will be asked to enter the access code listed below, as well as some personal information. Please follow the directions to create your username and password.     Your access code is: [email or web address]  Expires: 2018  3:40 PM     Your access code will  in 90 days. If you need help or a new code, please call your [facility] clinic or [phone].        Care EveryWhere ID     This is your Care EveryWhere ID. This could be used by other organizations to access your [facility] medical records  [id number]        Equal Access to Services     [facility] DELTA : Hadnimesh cruzo Soemelyn, waaxda luqadaha, qaybta kaalmada lawrence, allie hayden. So [facility] [phone].    ATENCIÓN: Si habla español, tiene a miller disposición servicios gratuitos de asistencia lingüística. Llame al [phone].    We comply with applicable federal civil rights laws and [place] laws. We do not discriminate on the basis of race, color, national origin, age, disability, sex, sexual orientation, or gender identity.               Review of " your medicines      CONTINUE these medicines which have NOT CHANGED        Dose / Directions    acetaminophen 325 MG tablet   Commonly known as:  TYLENOL   Used for:  Indication for care in labor or delivery        Dose:  650 mg   Take 2 tablets (650 mg) by mouth every 4 hours as needed for mild pain or fever (greater than or equal to 38? C /100.4? F (oral) or 38.5? C/ 101.4? F (core).)   Quantity:  100 tablet   Refills:  0       acetone (Urine) test Strp   Used for:  Gestational diabetes        Test daily. When negative for 1 week can reduce testing to once weekly   Quantity:  25 each   Refills:  1       blood glucose monitoring lancets   Used for:  Gestational diabetes        Use to test blood sugar 4 times daily or as directed.  Ok to substitute alternative if insurance prefers.   Quantity:  100 each   Refills:  3       blood glucose monitoring meter device kit   Used for:  Gestational diabetes        Use to test blood sugar 4 times daily or as directed.  Ok to substitute alternative if insurance prefers.   Quantity:  1 kit   Refills:  0       blood glucose monitoring test strip   Commonly known as:  CONTOUR NEXT TEST   Used for:  Gestational diabetes        Use to test blood sugar 4 times daily or as directed.   Quantity:  150 each   Refills:  3       calcium carbonate 500 MG chewable tablet   Commonly known as:  TUMS        Dose:  1 chew tab   Take 1 chew tab by mouth 3 times daily   Refills:  0       ferrous sulfate 142 (45 Fe) MG Tbcr   Commonly known as:  SLO-FE   Used for:  Indication for care in labor or delivery        Dose:  142 mg   Take 1 tablet (142 mg) by mouth daily   Quantity:  30 tablet   Refills:  0       ibuprofen 200 MG tablet   Commonly known as:  ADVIL/MOTRIN   Used for:  Indication for care in labor or delivery        Dose:  600 mg   Take 3 tablets (600 mg) by mouth every 6 hours as needed for other (cramping)   Quantity:  100 tablet   Refills:  0         STOP taking     GLYBURIDE PO                 Where to get your medicines      Some of these will need a paper prescription and others can be bought over the counter. Ask your nurse if you have questions.     You don't need a prescription for these medications     acetaminophen 325 MG tablet    ibuprofen 200 MG tablet                Protect others around you: Learn how to safely use, store and throw away your medicines at www.disposemymeds.org.             Medication List: This is a list of all your medications and when to take them. Check marks below indicate your daily home schedule. Keep this list as a reference.      Medications           Morning Afternoon Evening Bedtime As Needed    acetaminophen 325 MG tablet   Commonly known as:  TYLENOL   Take 2 tablets (650 mg) by mouth every 4 hours as needed for mild pain or fever (greater than or equal to 38? C /100.4? F (oral) or 38.5? C/ 101.4? F (core).)   Last time this was given:  650 mg on 7/8/2018  3:24 PM                                acetone (Urine) test Strp   Test daily. When negative for 1 week can reduce testing to once weekly                                blood glucose monitoring lancets   Use to test blood sugar 4 times daily or as directed.  Ok to substitute alternative if insurance prefers.                                blood glucose monitoring meter device kit   Use to test blood sugar 4 times daily or as directed.  Ok to substitute alternative if insurance prefers.                                blood glucose monitoring test strip   Commonly known as:  CONTOUR NEXT TEST   Use to test blood sugar 4 times daily or as directed.                                calcium carbonate 500 MG chewable tablet   Commonly known as:  TUMS   Take 1 chew tab by mouth 3 times daily                                ferrous sulfate 142 (45 Fe) MG Tbcr   Commonly known as:  SLO-FE   Take 1 tablet (142 mg) by mouth daily                                ibuprofen 200 MG tablet   Commonly known as:  ADVIL/MOTRIN    Take 3 tablets (600 mg) by mouth every 6 hours as needed for other (cramping)   Last time this was given:  800 mg on 7/8/2018  3:24 PM

## 2018-07-07 NOTE — L&D DELIVERY NOTE
First Stage:  Juanita Quiroga is a 37 year old G2, now  who is 38w4d pregnant and being admitted for induction of labor today, indication A2GDM. Pt was seen in the office yesterday for BPP. BPP was 6/8, two off for fetal breathing. NST was reactive. JULIANNA 7.7. Consulted with Dr. Kaur and decision made to induce today. Cervix in the office yesterday was 4/60/-3. FSBG has been well controlled on glyburide. Otherwise uncomplicated prenatal course. AROM performed at 1315 today, clear fluid. Cervix was 4/80/-2 at time of AROM. Epidural was placed soon after. Pt was complete at 1550. Pitocin was 10mU at time of complete dilatation and fetal heart rate tracing was Category I.     Second Stage:  Pt pushed with very good effort. Fetal heart rate deceleration into the 60s just before  of female  in vertex presentation over 2nd degree perineal laceration, nuchal cord x 1 easily reduced. Apgars 9&9, wgt TBD. Time of delivery: 1557. Baby placed immediately on maternal abdomen. Delayed cord clamping performed.     Third stage:   Placenta was undelivered 25 minutes after delivery despite uterine massage and gentle traction to cord. Dr. Serrano was called to hospital at that time. However, while waiting for her to arrive placenta did deliver intact with three vessel cord. 2nd degree perineal laceration repaired with 3.0 chromic and 4.0 vicryl in the usual fashion. EBL 600ml.     Assessment:     Mild PPH    Plan  Postpartum care per protocol.   Hgb in AM    Nicole Henry CNM

## 2018-07-07 NOTE — H&P
[facility] Labor and Delivery History and Physical    Juanita Quiroga MRN# [id number]   Age: 37 year old YOB: 1980     Date of Admission:  2018    Primary care provider: Nicole Henry           Chief Complaint:   Juanita Quiroga is a 37 year old  who is 38w4d pregnant and being admitted for induction of labor, indication A2GDM. Pt was seen in the office yesterday for BPP. BPP was 6/8, two off for fetal breathing. NST was reactive. JULIANNA 7.7. Consulted with Dr. Kaur and decision made to induce today. Cervix in the office yesterday was 4/60/-3.           Pregnancy history:     OBSTETRIC HISTORY:  x 1 of 9# baby.   CURRENT PREGNANCY: AMA, 1hr gtt 167, 3hr gtt fasting 92, 1 hr 185, 2 hr 199, 3 hr 118. Pt started glyburide at 32 weeks. At 36 weeks pt was taking Glyburide 5 mg at night and 2.5 mg in the morning with good control. Mildly anemic this pregnancy.                                                                      Obstetric History       T1      L1     SAB0   TAB0   Ectopic0   Multiple0   Live Births1       # Outcome Date GA Lbr [place]/2nd Weight Sex Delivery Anes PTL Lv   2 Current            1 Term 16 40w1d / 03:10 4.099 kg (9 lb 0.6 oz) F Vag-Spont EPI  SENA      Name: MARIANNE QUIROGAYLA      Apgar1:  9                Apgar5: 9          EDC: Estimated Date of Delivery: 2018    Prenatal Labs:   Lab Results   Component Value Date    ABO PENDING 2018    RH  Pos 2016    AS Neg 2016    HEPBANG neg 12/15/2015    TREPAB Negative 2016    HGB 10.2 (L) 2018       GBS Status:   Lab Results   Component Value Date    GBS neg 2018       Active Problem List  Patient Active Problem List   Diagnosis     Supervision of normal first pregnancy     Indication for care in labor or delivery      (spontaneous vaginal delivery)    Uterine leiomyoma: intramural 4cm      Pregnancy       Medication Prior to  Admission  Prescriptions Prior to Admission   Medication Sig Dispense Refill Last Dose     acetone, Urine, test STRP Test daily. When negative for 1 week can reduce testing to once weekly 25 each 1 7/7/2018 at Unknown time     blood glucose monitoring (DAMARIS CONTOUR NEXT USB MONITOR) meter device kit Use to test blood sugar 4 times daily or as directed.  Ok to substitute alternative if insurance prefers. 1 kit 0 7/7/2018 at Unknown time     blood glucose monitoring (DAMARIS MICROLET) lancets Use to test blood sugar 4 times daily or as directed.  Ok to substitute alternative if insurance prefers. 100 each 3 7/7/2018 at Unknown time     GLYBURIDE PO Take 3.75 mg by mouth At Bedtime        acetaminophen (TYLENOL) 325 MG tablet Take 2 tablets (650 mg) by mouth every 4 hours as needed for mild pain or fever (greater than or equal to 38  C /100.4  F (oral) or 38.5  C/ 101.4  F (core).) 100 tablet  More than a month at Unknown time     blood glucose monitoring (CONTOUR NEXT TEST) test strip Use to test blood sugar 4 times daily or as directed. 150 each 3      calcium carbonate (TUMS) 500 MG chewable tablet Take 1 chew tab by mouth 3 times daily   More than a month at Unknown time     ferrous sulfate (SLO-FE) 142 (45 FE) MG TBCR Take 1 tablet (142 mg) by mouth daily 30 tablet  More than a month at Unknown time     ibuprofen (ADVIL,MOTRIN) 200 MG tablet Take 3 tablets (600 mg) by mouth every 6 hours as needed for other (cramping)   More than a month at Unknown time   .        Maternal Past Medical History:     Past Medical History:   Diagnosis Date     Anemia     pregnancy      Diabetes (H)     Glyburide     Fallopian tube abscess     Right side     Heartburn during pregnancy      Hx: UTI (urinary tract infection)                           Social History:   I have reviewed this patient's social history          Physical Exam:   Vitals were reviewed  Blood pressure 114/68, pulse 86, temperature 98.2  F (36.8  C), temperature  "source Temporal, resp. rate 18, height 1.702 m (5' 7\"), not currently breastfeeding.  Constitutional:   awake, alert, cooperative, no apparent distress, and appears stated age      Cervix:   Membranes: intact   Dilation: 4   Effacement: 50%   Station:-2   Consistency: soft   Position: Posterior  Presentation:Vertex  Fetal Heart Rate Tracing: Category I  Tocometer: occasional ctxs  EFW: 8#2oz                       Assessment:   Juanita Quiroga is a 38w4d pregnant female admitted with induction of labor, indication A2GDM.          Plan:   Admit - see IP orders  Labor induction with Pitocin    Nicole Henry, ADRIA OWENSM  "

## 2018-07-07 NOTE — ANESTHESIA PROCEDURE NOTES
Peripheral nerve/Neuraxial procedure note : epidural catheter  Pre-Procedure  Performed by TASHA PRESTON  Location: OB      Pre-Anesthestic Checklist: patient identified, IV checked, risks and benefits discussed, informed consent and pre-op evaluation    Timeout  Correct Patient: Yes   Correct Procedure: Yes   Correct Site: Yes   Correct Laterality: N/A   Correct Position: Yes   Site Marked: N/A   .   Procedure Documentation    .    Procedure:    Epidural catheter.  Insertion Site:L3-4  (midline approach) Injection technique: LORT saline   Local skin infiltrated with mL of 1% lidocaine.  COOPER at 5 cm     Patient Prep;mask, sterile gloves, povidone-iodine 7.5% surgical scrub, patient draped.  .  Needle: Touhy needle Needle Gauge: 17.    Needle Length (Inches) 3.5  # of attempts: 1 and # of redirects:  .   . .  Catheter threaded easily  5 cm epidural space.  .   .    Assessment/Narrative  Paresthesias: No.  .  .  Aspiration negative for heme or CSF  . Test dose of 3 mL lidocaine 1.5% w/ 1:200,000 epinephrine at. Test dose negative for signs of intravascular, subdural or intrathecal injection.

## 2018-07-07 NOTE — IP AVS SNAPSHOT
[address]    [place] [address]    Phone:  [phone]                                       After Visit Summary   7/7/2018    Juanita Quiroga    MRN: [id number]           After Visit Summary Signature Page     I have received my discharge instructions, and my questions have been answered. I have discussed any challenges I see with this plan with the nurse or doctor.    ..........................................................................................................................................  Patient/Patient Representative Signature      ..........................................................................................................................................  Patient Representative Print Name and Relationship to Patient    ..................................................               ................................................  Date                                            Time    ..........................................................................................................................................  Reviewed by Signature/Title    ...................................................              ..............................................  Date                                                            Time

## 2018-07-07 NOTE — ANESTHESIA PREPROCEDURE EVALUATION
Anesthesia Plan      History & Physical Review  History and physical reviewed and following examination; no interval change.    ASA Status:  1 .        Plan for Epidural          Postoperative Care      Consents  Anesthetic plan, risks, benefits and alternatives discussed with:  Patient..                          .

## 2018-07-07 NOTE — PROGRESS NOTES
"OB Progress Note  2018  1:48 PM    S:  Pt having regular painful ctxs. Coping well.  No other complaints.      O:  /79  Pulse 73  Temp 98.2  F (36.8  C) (Temporal)  Resp 18  Ht 1.702 m (5' 7\")  Wt 75.8 kg (167 lb)  BMI 26.16 kg/m2  EFM: Category 1   [place]:  Ctx q 3.5-4.5 min  SVE:  4/80%/-2  Membranes: AROM, clear fluid    Pitocin at 10 mU/min    A/P:  37 year old  @38w4d admitted for IOL for A2GDM  1.  Routine cares  2.  Epidural when requested.   3.  Anticipate     Nicole Henry    "

## 2018-07-08 VITALS
DIASTOLIC BLOOD PRESSURE: 67 MMHG | TEMPERATURE: 98.5 F | HEART RATE: 64 BPM | SYSTOLIC BLOOD PRESSURE: 108 MMHG | WEIGHT: 167 LBS | RESPIRATION RATE: 16 BRPM | HEIGHT: 67 IN | BODY MASS INDEX: 26.21 KG/M2 | OXYGEN SATURATION: 100 %

## 2018-07-08 LAB
GLUCOSE BLDC GLUCOMTR-MCNC: 82 MG/DL (ref 70–99)
HGB BLD-MCNC: 10.1 G/DL (ref 11.7–15.7)

## 2018-07-08 PROCEDURE — 85018 HEMOGLOBIN: CPT | Performed by: REGISTERED NURSE

## 2018-07-08 PROCEDURE — 36415 COLL VENOUS BLD VENIPUNCTURE: CPT | Performed by: REGISTERED NURSE

## 2018-07-08 PROCEDURE — 00000146 ZZHCL STATISTIC GLUCOSE BY METER IP

## 2018-07-08 PROCEDURE — 25000132 ZZH RX MED GY IP 250 OP 250 PS 637: Performed by: REGISTERED NURSE

## 2018-07-08 RX ORDER — ACETAMINOPHEN 325 MG/1
650 TABLET ORAL EVERY 4 HOURS PRN
Qty: 100 TABLET | Status: ON HOLD | COMMUNITY
Start: 2018-07-08 | End: 2021-06-22

## 2018-07-08 RX ORDER — IBUPROFEN 200 MG
600 TABLET ORAL EVERY 6 HOURS PRN
Qty: 100 TABLET | Status: ON HOLD | COMMUNITY
Start: 2018-07-08 | End: 2021-06-22

## 2018-07-08 RX ADMIN — ACETAMINOPHEN 650 MG: 325 TABLET, FILM COATED ORAL at 07:44

## 2018-07-08 RX ADMIN — ACETAMINOPHEN 650 MG: 325 TABLET, FILM COATED ORAL at 00:43

## 2018-07-08 RX ADMIN — IBUPROFEN 800 MG: 400 TABLET ORAL at 15:24

## 2018-07-08 RX ADMIN — IBUPROFEN 800 MG: 400 TABLET ORAL at 07:44

## 2018-07-08 RX ADMIN — ACETAMINOPHEN 650 MG: 325 TABLET, FILM COATED ORAL at 15:24

## 2018-07-08 RX ADMIN — SENNOSIDES AND DOCUSATE SODIUM 2 TABLET: 8.6; 5 TABLET ORAL at 07:45

## 2018-07-08 RX ADMIN — IBUPROFEN 800 MG: 400 TABLET ORAL at 00:43

## 2018-07-08 NOTE — PROGRESS NOTES
"[facility]   Post-Partum Progress Note        Interval History:   Doing well.  Pain is adequately controlled.  No fevers.  No history of foul-smelling vaginal discharge.  Good appetite.  Denies chest pain, shortness of breath, nausea or vomiting.  Vaginal bleeding is similar to a heavy menstrual flow.  Breastfeeding well. Pt would like to go home this evening,.            Medications:   All medications related to the patient's surgery have been reviewed          Physical Exam:   All vitals stable  Blood pressure 101/63, pulse 65, temperature 98.5  F (36.9  C), temperature source Oral, resp. rate 16, height 1.702 m (5' 7\"), weight 75.8 kg (167 lb), SpO2 100 %, unknown if currently breastfeeding.  Uterine fundus is firm, non-tender and at the level of the umbilicus  Lower extremities non-tender, trace edema.           Data:     Hemoglobin   Date Value Ref Range Status   07/07/2018 10.2 (L) 11.7 - 15.7 g/dL Final   05/14/2018 11.0 (L) 11.7 - 15.7 g/dL Final   08/02/2016 9.2 (L) 11.7 - 15.7 g/dL Final   12/15/2015 13.8 11.7 - 15.7 g/dL Final            Assessment and Plan:    Assessment:   Post-partum day #1  Normal spontaneous vaginal delivery  :     Doing well.   Plan:   Discharge later today     Nicole Henry CNM  "

## 2018-07-08 NOTE — PLAN OF CARE
Problem: Patient Care Overview  Goal: Plan of Care/Patient Progress Review  Outcome: Improving  Vital signs stable. Fundus firm, scant flow. Baby breastfeeding well, on demand feedings encouraged. Pain managed with tylenol &ibuprofen. Fasting BS in am. Parents independent with self and  cares. Questions and concerns addressed. Will continue to monitor.

## 2018-07-08 NOTE — DISCHARGE INSTRUCTIONS

## 2018-07-08 NOTE — PLAN OF CARE
Problem: Patient Care Overview  Goal: Plan of Care/Patient Progress Review  Outcome: No Change  Vss. Fundus Firm, minimal bleeding. Up ad prasanna. Breast feeding well. Pain controlled with ibuprofen and tylenol. Possible discharge this evening.

## 2018-07-08 NOTE — PROGRESS NOTES
Late entry due to patient care:  Care assumed at 1515. Nicole Henry CNM bedside at 1515 as well. Straight cath performed at 1520 for 200 ml. Pt comfortable with epidural. VSS. FHT category 1. Pt progressed to complete and ready to push at 1550. Pt in stirrups at 1555 and pushing started. Vigorous baby girl delivered at 1557 with apgars of 9 and 9. Mother and baby doing well.

## 2018-07-08 NOTE — LACTATION NOTE
Initial Lactation visit. Recommend unlimited, frequent breast feedings:  At least 8 - 12 times every 24 hours.  Avoid pacifiers and supplementation with formula unless medically indicated    Explained benefits of holding baby skin on skin to help promote better breastfeeding outcomes. Experienced mom reports good latch and suckle- no questions or concerns. Will continue to follow as needed. N Day RN IBCLC

## 2018-07-08 NOTE — PLAN OF CARE
Problem: Patient Care Overview  Goal: Plan of Care/Patient Progress Review  Outcome: Therapy, progress toward functional goals as expected  Patient arrived in room 4406 around 1915.  Vital signs are stable.  FFu/u .  Fallopian tube abscess as big as golf ball felt on the right side of the uterus.  Able to void and empty her bladder.  Takes ibuprofen and tylenol for discomfort.  She has been oriented to room, call light system, and infant safety procedure.  Educational folder has been introduced to patient.  Patient is encouraged to call whenever she has questions and concerns.  Will continue to monitor.

## 2018-07-08 NOTE — PROGRESS NOTES
Data: Juanita Quiroga transferred to 406 via wheelchair at 1905. Baby transferred via parent's arms.  Action: Receiving unit notified of transfer: Yes. Patient and family notified of room change. Belongings sent to receiving unit. Accompanied by Registered Nurse. Oriented patient to surroundings. Call light within reach. ID bands double-checked with receiving RN.  Response: Patient tolerated transfer and is stable.

## 2018-07-09 NOTE — ANESTHESIA POSTPROCEDURE EVALUATION
Patient: Juanita Quiroga    * No procedures listed *    Diagnosis:* No pre-op diagnosis entered *  Diagnosis Additional Information: No value filed.    Anesthesia Type:  No value filed.    Note:  Anesthesia Post Evaluation    Patient location during evaluation: Floor  Patient participation: Able to fully participate in evaluation     Anesthetic complications: None    Comments: Pt doing well.  Denies epidural-related complaints.        Last vitals:  Vitals:    07/08/18 0043 07/08/18 0744 07/08/18 1625   BP: 100/62 101/63 108/67   Pulse: 79 65 64   Resp: 16 16 16   Temp: 36.4  C (97.5  F) 36.9  C (98.5  F) 36.9  C (98.5  F)   SpO2:            Electronically Signed By: GOMEZ ROE MD  July 8, 2018  8:44 PM

## 2020-06-04 NOTE — MR AVS SNAPSHOT
After Visit Summary   5/22/2018    Juanita Quiroga    MRN: [id number]           Patient Information     Date Of Birth          1980        Visit Information        Provider Department      5/22/2018 10:00 AM [facility] DIABETES ED RESOURCE [place] [facility] Instructions    1. Check glucose 4 times daily, before breakfast daily and 1 hour after each meal, as recommended.    2. Check ketones daily or once a week after they have been negative for 7 days in a row. If ketones are positive, let your diabetes educator know and continue to check daily until they are negative for 7 days in a row.    3. Continue with recommended physical activity.    4. Continue to follow recommended meal plan: 2-3 carbs at breakfast, 3-4 carbs at lunch, 3-4 carbs at supper, 1-2 carbs at snacks.  Follow consistent CHO meal plan, eat CHO and protein/fat at all meals/snacks.  **Add some carbohydrates at lunch and dinner. Try to aim for 45 grams of carb for each of these meals. This could be 2 T dried fruit, 8 oz milk, and 1 small dany.     5. Follow-up with OB doctor as recommended.    6. Call/e-mail diabetes educator if 3 or more blood sugars are above the goal in 1 week or if ketones are positive.     **Email us your numbers on Friday May 25th in the morning.       AFTER YOU DELIVER:  - Continue with healthy eating and physical activity to get back to your pre-pregnancy weight.   - Have a follow-up 2-hour Glucose Tolerance Test at your 6-week post-partum check-up.   - Have your fasting blood sugar checked once a year.  - Plan ahead for future pregnancies - eat healthy, keep active, work with your doctor to check for gestational diabetes early on in the pregnancy and check blood sugars as recommended by your doctor.             Follow-ups after your visit        Who to contact     If you have questions or need follow up information about today's clinic visit or your schedule please contact [facility] [place] directly at [phone].  Normal or non-critical lab and imaging results will be communicated to you by MyChart, letter or phone within 4 business days after the clinic has received the results. If you do not hear from us within 7 days, please contact the clinic through [facility] or phone. If you have a critical or abnormal lab result, we will notify you by phone as soon as possible.  Submit refill requests through [facility] [facility] or call your pharmacy and they will forward the refill request to us. Please allow 3 business days for your refill to be completed.          Additional Information About Your Visit        [employer] Information     [employer] lets you send messages to your doctor, view your test results, renew your prescriptions, schedule appointments and more. To sign up, go to [email or web address] . Click on \"Log in\" on the left side of the screen, which will take you to the Welcome page. Then click on \"Sign up Now\" on the right side of the page.     You will be asked to enter the access code listed below, as well as some personal information. Please follow the directions to create your username and password.     Your access code is: [email or web address]  Expires: 2018  3:40 PM     Your access code will  in 90 days. If you need help or a new code, please call your [facility] clinic or [phone].        Care EveryWhere ID     This is your Care EveryWhere ID. This could be used by other organizations to access your [facility] medical records  [id number]         Blood Pressure from Last 3 Encounters:   18 111/85   16 93/47   16 113/74    Weight from Last 3 Encounters:   16 78.9 kg (174 lb)              Today, you had the following     No orders found for display       Primary Care Provider Office Phone # Fax #    ADRIA Hanna [phone]       [facility] [address]  [facility] [address]        Equal Access to " Services     [facility]: Hadii wes baker nancykamille Chayitoali, waaxda luqadaha, qaybta kaalmamanish bravo, allie ramey . So [facility] [phone].    ATENCIÓN: Si habla espkavitha, tiene a miller disposición servicios gratuitos de asistencia lingüística. Llame al [phone].    We comply with applicable federal civil rights laws and [place] laws. We do not discriminate on the basis of race, color, national origin, age, disability, sex, sexual orientation, or gender identity.            Thank you!     Thank you for choosing [facility] [place]  for your care. Our goal is always to provide you with excellent care. Hearing back from our patients is one way we can continue to improve our services. Please take a few minutes to complete the written survey that you may receive in the mail after your visit with us. Thank you!             Your Updated Medication List - Protect others around you: Learn how to safely use, store and throw away your medicines at www.disposemymeds.org.          This list is accurate as of 5/22/18 10:40 AM.  Always use your most recent med list.                   Brand Name Dispense Instructions for use Diagnosis    acetaminophen 325 MG tablet    TYLENOL    100 tablet    Take 2 tablets (650 mg) by mouth every 4 hours as needed for mild pain or fever (greater than or equal to 38? C /100.4? F (oral) or 38.5? C/ 101.4? F (core).)    Indication for care in labor or delivery       acetone (Urine) test Strp     25 each    Test daily. When negative for 1 week can reduce testing to once weekly    Gestational diabetes       blood glucose monitoring lancets     100 each    Use to test blood sugar 4 times daily or as directed.  Ok to substitute alternative if insurance prefers.    Gestational diabetes       blood glucose monitoring meter device kit     1 kit    Use to test blood sugar 4 times daily or as directed.  Ok to substitute alternative if insurance prefers.     Gestational diabetes       blood glucose monitoring test strip    CONTOUR NEXT TEST    150 each    Use to test blood sugar 4 times daily or as directed.    Gestational diabetes       calcium carbonate 500 MG chewable tablet    TUMS     Take 1 chew tab by mouth 3 times daily        ferrous sulfate 142 (45 Fe) MG Tbcr    SLO-FE    30 tablet    Take 1 tablet (142 mg) by mouth daily    Indication for care in labor or delivery       ibuprofen 200 MG tablet    ADVIL/MOTRIN     Take 3 tablets (600 mg) by mouth every 6 hours as needed for other (cramping)    Indication for care in labor or delivery          normal...

## 2021-06-01 LAB — GROUP B STREP PCR: NEGATIVE

## 2021-06-20 ENCOUNTER — HOSPITAL ENCOUNTER (OUTPATIENT)
Dept: LAB | Facility: CLINIC | Age: 41
Discharge: HOME | End: 2021-06-20
Attending: REGISTERED NURSE | Admitting: REGISTERED NURSE
Payer: COMMERCIAL

## 2021-06-20 LAB
LABORATORY COMMENT REPORT: NORMAL
SARS-COV-2 RNA RESP QL NAA+PROBE: NEGATIVE
SARS-COV-2 RNA RESP QL NAA+PROBE: NORMAL
SPECIMEN SOURCE: NORMAL
SPECIMEN SOURCE: NORMAL

## 2021-06-20 PROCEDURE — U0005 INFEC AGEN DETEC AMPLI PROBE: HCPCS | Performed by: REGISTERED NURSE

## 2021-06-20 PROCEDURE — U0003 INFECTIOUS AGENT DETECTION BY NUCLEIC ACID (DNA OR RNA); SEVERE ACUTE RESPIRATORY SYNDROME CORONAVIRUS 2 (SARS-COV-2) (CORONAVIRUS DISEASE [COVID-19]), AMPLIFIED PROBE TECHNIQUE, MAKING USE OF HIGH THROUGHPUT TECHNOLOGIES AS DESCRIBED BY CMS-2020-01-R: HCPCS | Performed by: REGISTERED NURSE

## 2021-06-22 ENCOUNTER — HOSPITAL ENCOUNTER (INPATIENT)
Facility: CLINIC | Age: 41
LOS: 1 days | Discharge: HOME | End: 2021-06-23
Attending: REGISTERED NURSE | Admitting: REGISTERED NURSE
Payer: COMMERCIAL

## 2021-06-22 ENCOUNTER — ANESTHESIA (OUTPATIENT)
Dept: OBGYN | Facility: CLINIC | Age: 41
End: 2021-06-22
Payer: COMMERCIAL

## 2021-06-22 ENCOUNTER — ANESTHESIA EVENT (OUTPATIENT)
Dept: OBGYN | Facility: CLINIC | Age: 41
End: 2021-06-22
Payer: COMMERCIAL

## 2021-06-22 LAB
ABO + RH BLD: NORMAL
ABO + RH BLD: NORMAL
BLD GP AB SCN SERPL QL: NORMAL
BLOOD BANK CMNT PATIENT-IMP: NORMAL
GLUCOSE BLDC GLUCOMTR-MCNC: 82 MG/DL (ref 70–99)
GLUCOSE BLDC GLUCOMTR-MCNC: 84 MG/DL (ref 70–99)
GLUCOSE BLDC GLUCOMTR-MCNC: 84 MG/DL (ref 70–99)
GLUCOSE BLDC GLUCOMTR-MCNC: 99 MG/DL (ref 70–99)
GLUCOSE SERPL-MCNC: 119 MG/DL (ref 70–99)
HGB BLD-MCNC: 12.5 G/DL (ref 11.7–15.7)
SPECIMEN EXP DATE BLD: NORMAL
T PALLIDUM AB SER QL: NONREACTIVE

## 2021-06-22 PROCEDURE — 86901 BLOOD TYPING SEROLOGIC RH(D): CPT | Performed by: REGISTERED NURSE

## 2021-06-22 PROCEDURE — 10907ZC DRAINAGE OF AMNIOTIC FLUID, THERAPEUTIC FROM PRODUCTS OF CONCEPTION, VIA NATURAL OR ARTIFICIAL OPENING: ICD-10-PCS | Performed by: REGISTERED NURSE

## 2021-06-22 PROCEDURE — 999N001017 HC STATISTIC GLUCOSE BY METER IP

## 2021-06-22 PROCEDURE — 250N000013 HC RX MED GY IP 250 OP 250 PS 637: Performed by: REGISTERED NURSE

## 2021-06-22 PROCEDURE — 370N000003 HC ANESTHESIA WARD SERVICE

## 2021-06-22 PROCEDURE — 250N000011 HC RX IP 250 OP 636: Performed by: ANESTHESIOLOGY

## 2021-06-22 PROCEDURE — 258N000003 HC RX IP 258 OP 636: Performed by: REGISTERED NURSE

## 2021-06-22 PROCEDURE — 86900 BLOOD TYPING SEROLOGIC ABO: CPT | Performed by: REGISTERED NURSE

## 2021-06-22 PROCEDURE — 120N000012 HC R&B POSTPARTUM

## 2021-06-22 PROCEDURE — 3E0R3BZ INTRODUCTION OF ANESTHETIC AGENT INTO SPINAL CANAL, PERCUTANEOUS APPROACH: ICD-10-PCS | Performed by: ANESTHESIOLOGY

## 2021-06-22 PROCEDURE — 86780 TREPONEMA PALLIDUM: CPT | Performed by: REGISTERED NURSE

## 2021-06-22 PROCEDURE — 00HU33Z INSERTION OF INFUSION DEVICE INTO SPINAL CANAL, PERCUTANEOUS APPROACH: ICD-10-PCS | Performed by: ANESTHESIOLOGY

## 2021-06-22 PROCEDURE — 82947 ASSAY GLUCOSE BLOOD QUANT: CPT | Performed by: REGISTERED NURSE

## 2021-06-22 PROCEDURE — 86850 RBC ANTIBODY SCREEN: CPT | Performed by: REGISTERED NURSE

## 2021-06-22 PROCEDURE — 722N000001 HC LABOR CARE VAGINAL DELIVERY SINGLE

## 2021-06-22 PROCEDURE — 0KQM0ZZ REPAIR PERINEUM MUSCLE, OPEN APPROACH: ICD-10-PCS | Performed by: REGISTERED NURSE

## 2021-06-22 PROCEDURE — 250N000009 HC RX 250: Performed by: REGISTERED NURSE

## 2021-06-22 PROCEDURE — 3E033VJ INTRODUCTION OF OTHER HORMONE INTO PERIPHERAL VEIN, PERCUTANEOUS APPROACH: ICD-10-PCS | Performed by: REGISTERED NURSE

## 2021-06-22 PROCEDURE — 250N000009 HC RX 250: Performed by: ANESTHESIOLOGY

## 2021-06-22 PROCEDURE — 85018 HEMOGLOBIN: CPT | Performed by: REGISTERED NURSE

## 2021-06-22 RX ORDER — OXYTOCIN 10 [USP'U]/ML
10 INJECTION, SOLUTION INTRAMUSCULAR; INTRAVENOUS
Status: DISCONTINUED | OUTPATIENT
Start: 2021-06-22 | End: 2021-06-22

## 2021-06-22 RX ORDER — EPHEDRINE SULFATE 50 MG/ML
5 INJECTION, SOLUTION INTRAMUSCULAR; INTRAVENOUS; SUBCUTANEOUS
Status: DISCONTINUED | OUTPATIENT
Start: 2021-06-22 | End: 2021-06-23 | Stop reason: HOSPADM

## 2021-06-22 RX ORDER — OXYTOCIN/0.9 % SODIUM CHLORIDE 30/500 ML
1-24 PLASTIC BAG, INJECTION (ML) INTRAVENOUS CONTINUOUS
Status: DISCONTINUED | OUTPATIENT
Start: 2021-06-22 | End: 2021-06-22

## 2021-06-22 RX ORDER — NALOXONE HYDROCHLORIDE 0.4 MG/ML
0.4 INJECTION, SOLUTION INTRAMUSCULAR; INTRAVENOUS; SUBCUTANEOUS
Status: DISCONTINUED | OUTPATIENT
Start: 2021-06-22 | End: 2021-06-22

## 2021-06-22 RX ORDER — AMOXICILLIN 250 MG
1 CAPSULE ORAL 2 TIMES DAILY
Status: DISCONTINUED | OUTPATIENT
Start: 2021-06-22 | End: 2021-06-23 | Stop reason: HOSPADM

## 2021-06-22 RX ORDER — ONDANSETRON 4 MG/1
4 TABLET, ORALLY DISINTEGRATING ORAL EVERY 6 HOURS PRN
Status: DISCONTINUED | OUTPATIENT
Start: 2021-06-22 | End: 2021-06-23 | Stop reason: HOSPADM

## 2021-06-22 RX ORDER — NALOXONE HYDROCHLORIDE 0.4 MG/ML
0.2 INJECTION, SOLUTION INTRAMUSCULAR; INTRAVENOUS; SUBCUTANEOUS
Status: DISCONTINUED | OUTPATIENT
Start: 2021-06-22 | End: 2021-06-22

## 2021-06-22 RX ORDER — CARBOPROST TROMETHAMINE 250 UG/ML
250 INJECTION, SOLUTION INTRAMUSCULAR
Status: DISCONTINUED | OUTPATIENT
Start: 2021-06-22 | End: 2021-06-22

## 2021-06-22 RX ORDER — NALBUPHINE HYDROCHLORIDE 10 MG/ML
2.5-5 INJECTION, SOLUTION INTRAMUSCULAR; INTRAVENOUS; SUBCUTANEOUS EVERY 6 HOURS PRN
Status: DISCONTINUED | OUTPATIENT
Start: 2021-06-22 | End: 2021-06-23 | Stop reason: HOSPADM

## 2021-06-22 RX ORDER — OXYTOCIN/0.9 % SODIUM CHLORIDE 30/500 ML
340 PLASTIC BAG, INJECTION (ML) INTRAVENOUS CONTINUOUS PRN
Status: DISCONTINUED | OUTPATIENT
Start: 2021-06-22 | End: 2021-06-23 | Stop reason: HOSPADM

## 2021-06-22 RX ORDER — TERBUTALINE SULFATE 1 MG/ML
0.25 INJECTION, SOLUTION SUBCUTANEOUS
Status: DISCONTINUED | OUTPATIENT
Start: 2021-06-22 | End: 2021-06-22

## 2021-06-22 RX ORDER — IBUPROFEN 400 MG/1
800 TABLET, FILM COATED ORAL EVERY 6 HOURS PRN
Status: DISCONTINUED | OUTPATIENT
Start: 2021-06-22 | End: 2021-06-23 | Stop reason: HOSPADM

## 2021-06-22 RX ORDER — MODIFIED LANOLIN
OINTMENT (GRAM) TOPICAL
Status: DISCONTINUED | OUTPATIENT
Start: 2021-06-22 | End: 2021-06-23 | Stop reason: HOSPADM

## 2021-06-22 RX ORDER — ONDANSETRON 2 MG/ML
4 INJECTION INTRAMUSCULAR; INTRAVENOUS EVERY 6 HOURS PRN
Status: DISCONTINUED | OUTPATIENT
Start: 2021-06-22 | End: 2021-06-23 | Stop reason: HOSPADM

## 2021-06-22 RX ORDER — OXYTOCIN 10 [USP'U]/ML
10 INJECTION, SOLUTION INTRAMUSCULAR; INTRAVENOUS
Status: DISCONTINUED | OUTPATIENT
Start: 2021-06-22 | End: 2021-06-23 | Stop reason: HOSPADM

## 2021-06-22 RX ORDER — BISACODYL 10 MG
10 SUPPOSITORY, RECTAL RECTAL DAILY PRN
Status: DISCONTINUED | OUTPATIENT
Start: 2021-06-24 | End: 2021-06-23 | Stop reason: HOSPADM

## 2021-06-22 RX ORDER — PRENATAL VIT/IRON FUM/FOLIC AC 27MG-0.8MG
1 TABLET ORAL DAILY
COMMUNITY

## 2021-06-22 RX ORDER — HYDROCORTISONE 2.5 %
CREAM (GRAM) TOPICAL 3 TIMES DAILY PRN
Status: DISCONTINUED | OUTPATIENT
Start: 2021-06-22 | End: 2021-06-23 | Stop reason: HOSPADM

## 2021-06-22 RX ORDER — TRANEXAMIC ACID 10 MG/ML
1 INJECTION, SOLUTION INTRAVENOUS EVERY 30 MIN PRN
Status: DISCONTINUED | OUTPATIENT
Start: 2021-06-22 | End: 2021-06-22

## 2021-06-22 RX ORDER — OXYCODONE AND ACETAMINOPHEN 5; 325 MG/1; MG/1
1 TABLET ORAL
Status: DISCONTINUED | OUTPATIENT
Start: 2021-06-22 | End: 2021-06-22

## 2021-06-22 RX ORDER — LIDOCAINE 40 MG/G
CREAM TOPICAL
Status: DISCONTINUED | OUTPATIENT
Start: 2021-06-22 | End: 2021-06-22

## 2021-06-22 RX ORDER — FENTANYL CITRATE 50 UG/ML
INJECTION, SOLUTION INTRAMUSCULAR; INTRAVENOUS
Status: COMPLETED | OUTPATIENT
Start: 2021-06-22 | End: 2021-06-22

## 2021-06-22 RX ORDER — ROPIVACAINE HYDROCHLORIDE 2 MG/ML
INJECTION, SOLUTION EPIDURAL; INFILTRATION; PERINEURAL
Status: COMPLETED | OUTPATIENT
Start: 2021-06-22 | End: 2021-06-22

## 2021-06-22 RX ORDER — AMOXICILLIN 250 MG
2 CAPSULE ORAL 2 TIMES DAILY
Status: DISCONTINUED | OUTPATIENT
Start: 2021-06-22 | End: 2021-06-23 | Stop reason: HOSPADM

## 2021-06-22 RX ORDER — ACETAMINOPHEN 325 MG/1
650 TABLET ORAL EVERY 4 HOURS PRN
Status: DISCONTINUED | OUTPATIENT
Start: 2021-06-22 | End: 2021-06-22

## 2021-06-22 RX ORDER — SODIUM CHLORIDE, SODIUM LACTATE, POTASSIUM CHLORIDE, CALCIUM CHLORIDE 600; 310; 30; 20 MG/100ML; MG/100ML; MG/100ML; MG/100ML
INJECTION, SOLUTION INTRAVENOUS CONTINUOUS
Status: DISCONTINUED | OUTPATIENT
Start: 2021-06-22 | End: 2021-06-22

## 2021-06-22 RX ORDER — IBUPROFEN 400 MG/1
800 TABLET, FILM COATED ORAL
Status: DISCONTINUED | OUTPATIENT
Start: 2021-06-22 | End: 2021-06-22

## 2021-06-22 RX ORDER — ROPIVACAINE HYDROCHLORIDE 2 MG/ML
10 INJECTION, SOLUTION EPIDURAL; INFILTRATION; PERINEURAL ONCE
Status: DISCONTINUED | OUTPATIENT
Start: 2021-06-22 | End: 2021-06-23 | Stop reason: HOSPADM

## 2021-06-22 RX ORDER — LIDOCAINE HYDROCHLORIDE AND EPINEPHRINE 15; 5 MG/ML; UG/ML
INJECTION, SOLUTION EPIDURAL PRN
Status: DISCONTINUED | OUTPATIENT
Start: 2021-06-22 | End: 2021-06-22

## 2021-06-22 RX ORDER — TRANEXAMIC ACID 10 MG/ML
1 INJECTION, SOLUTION INTRAVENOUS EVERY 30 MIN PRN
Status: DISCONTINUED | OUTPATIENT
Start: 2021-06-22 | End: 2021-06-23 | Stop reason: HOSPADM

## 2021-06-22 RX ORDER — OXYTOCIN/0.9 % SODIUM CHLORIDE 30/500 ML
100-340 PLASTIC BAG, INJECTION (ML) INTRAVENOUS CONTINUOUS PRN
Status: DISCONTINUED | OUTPATIENT
Start: 2021-06-22 | End: 2021-06-22

## 2021-06-22 RX ORDER — FENTANYL CITRATE 50 UG/ML
100 INJECTION, SOLUTION INTRAMUSCULAR; INTRAVENOUS ONCE
Status: DISCONTINUED | OUTPATIENT
Start: 2021-06-22 | End: 2021-06-23 | Stop reason: HOSPADM

## 2021-06-22 RX ORDER — METHYLERGONOVINE MALEATE 0.2 MG/ML
200 INJECTION INTRAVENOUS
Status: DISCONTINUED | OUTPATIENT
Start: 2021-06-22 | End: 2021-06-22

## 2021-06-22 RX ORDER — ACETAMINOPHEN 325 MG/1
650 TABLET ORAL EVERY 4 HOURS PRN
Status: DISCONTINUED | OUTPATIENT
Start: 2021-06-22 | End: 2021-06-23 | Stop reason: HOSPADM

## 2021-06-22 RX ORDER — ONDANSETRON 2 MG/ML
4 INJECTION INTRAMUSCULAR; INTRAVENOUS EVERY 6 HOURS PRN
Status: DISCONTINUED | OUTPATIENT
Start: 2021-06-22 | End: 2021-06-22

## 2021-06-22 RX ORDER — OXYTOCIN/0.9 % SODIUM CHLORIDE 30/500 ML
100 PLASTIC BAG, INJECTION (ML) INTRAVENOUS CONTINUOUS
Status: DISCONTINUED | OUTPATIENT
Start: 2021-06-22 | End: 2021-06-23 | Stop reason: HOSPADM

## 2021-06-22 RX ADMIN — SENNOSIDES AND DOCUSATE SODIUM 1 TABLET: 8.6; 5 TABLET ORAL at 21:18

## 2021-06-22 RX ADMIN — ACETAMINOPHEN 650 MG: 325 TABLET, FILM COATED ORAL at 19:02

## 2021-06-22 RX ADMIN — SODIUM CHLORIDE, POTASSIUM CHLORIDE, SODIUM LACTATE AND CALCIUM CHLORIDE: 600; 310; 30; 20 INJECTION, SOLUTION INTRAVENOUS at 08:39

## 2021-06-22 RX ADMIN — LIDOCAINE HYDROCHLORIDE AND EPINEPHRINE 3 ML: 15; 5 INJECTION, SOLUTION EPIDURAL at 12:29

## 2021-06-22 RX ADMIN — Medication 2 MILLI-UNITS/MIN: at 08:57

## 2021-06-22 RX ADMIN — SODIUM CHLORIDE, POTASSIUM CHLORIDE, SODIUM LACTATE AND CALCIUM CHLORIDE 1000 ML: 600; 310; 30; 20 INJECTION, SOLUTION INTRAVENOUS at 12:01

## 2021-06-22 RX ADMIN — ROPIVACAINE HYDROCHLORIDE 10 ML: 2 INJECTION, SOLUTION EPIDURAL; INFILTRATION at 12:30

## 2021-06-22 RX ADMIN — IBUPROFEN 800 MG: 400 TABLET ORAL at 19:02

## 2021-06-22 RX ADMIN — Medication 12 ML/HR: at 12:31

## 2021-06-22 RX ADMIN — FENTANYL CITRATE 100 MCG: 50 INJECTION, SOLUTION INTRAMUSCULAR; INTRAVENOUS at 12:30

## 2021-06-22 ASSESSMENT — MIFFLIN-ST. JEOR: SCORE: 1465.6

## 2021-06-22 NOTE — PROGRESS NOTES
"OB Progress Note  2021  11:46 AM    S:  Pt coping well with ctxs. Feeling a lot of pain with her uterine fibroid and ctxs.  No other complaints.      O:  /73   Pulse 75   Temp 97.4  F (36.3  C) (Temporal)   Resp 16   Ht 1.702 m (5' 7\")   Wt (!) 7.258 kg (16 lb)   BMI 2.51 kg/m    EFM:  Category 1  [place]:  Ctx q3min  SVE:  5/80%/-2  Membranes: intact    Pitocin at 8 mU/min    A/P:  40 year old  @39w0d admitted with IOL for A2GDM.   1.  Routine cares  2.  Epidural PRN  3.  Anticipate     ADRIA Hawkins CNM    "

## 2021-06-22 NOTE — ANESTHESIA PREPROCEDURE EVALUATION
Anesthesia Pre-Procedure Evaluation    Patient: Juanita Quiroga   MRN: [id number] : 1980        Preoperative Diagnosis: * No surgery found *   Procedure :      Past Medical History:   Diagnosis Date     Anemia     pregnancy      Diabetes (H)     Glyburide     Fallopian tube abscess     Right side     Heartburn during pregnancy      Hx: UTI (urinary tract infection)       Past Surgical History:   Procedure Laterality Date     APPENDECTOMY OPEN        No Known Allergies   Social History     Tobacco Use     Smoking status: Never Smoker     Smokeless tobacco: Never Used   Substance Use Topics     Alcohol use: Yes     Comment: None with pregnancy      Wt Readings from Last 1 Encounters:   21 (!) 7.258 kg (16 lb)        Anesthesia Evaluation   Pt has had prior anesthetic.         ROS/MED HX  ENT/Pulmonary:    (-) asthma   Neurologic:  - neg neurologic ROS     Cardiovascular:    (-) PIH   METS/Exercise Tolerance:     Hematologic:     (+) no anticoagulation therapy, no coagulopathy,     Musculoskeletal:       GI/Hepatic:     (+) GERD,     Renal/Genitourinary:       Endo:     (+) type I DM,     Psychiatric/Substance Use:       Infectious Disease:       Malignancy:       Other:            Physical Exam    Airway        Mallampati: II   TM distance: > 3 FB   Neck ROM: full     Respiratory Devices and Support         Dental  no notable dental history         Cardiovascular   cardiovascular exam normal          Pulmonary   pulmonary exam normal                OUTSIDE LABS:  CBC:   Lab Results   Component Value Date    WBC 7.7 2018    WBC 9.1 2018    HGB 12.5 2021    HGB 10.1 (L) 2018    HCT 31.9 (L) 2018    HCT 33.7 (L) 2018     2018     2018     BMP:   Lab Results   Component Value Date     2018    POTASSIUM 3.6 2018    CHLORIDE 106 2018    CO2 24 2018    BUN 8 2018    CR 0.53 2018     (H) 2021    GLC  74 05/14/2018     COAGS:   Lab Results   Component Value Date    INR 0.90 05/14/2018     POC:   Lab Results   Component Value Date    BGM 82 07/08/2018     HEPATIC:   Lab Results   Component Value Date    ALBUMIN 2.8 (L) 05/14/2018    PROTTOTAL 7.2 05/14/2018    ALT 19 05/14/2018    AST 19 05/14/2018    ALKPHOS 87 05/14/2018    BILITOTAL 0.3 05/14/2018     OTHER:   Lab Results   Component Value Date    TYE 8.7 05/14/2018    LIPASE 144 05/14/2018       Anesthesia Plan    ASA Status:  2      Anesthesia Type: Epidural.              Consents    Anesthesia Plan(s) and associated risks, benefits, and realistic alternatives discussed. Questions answered and patient/representative(s) expressed understanding.     - Discussed with:  Patient         Postoperative Care            Comments:    Orders to manage the epidural infusion have been entered, and through coordination with the nurse, we will continute to manage and monitor the patient's labor epidural.  We will continuously be available to adjust as needed thruout the entire L&D process.             Ginger Fritz MD

## 2021-06-22 NOTE — L&D DELIVERY NOTE
DELIVERY SUMMARY:     Juanita Quiroga is a 40 year old G3, now  who was 39w0d pregnant on admission today for induction of labor, indication A2GDM.  Vertex on US yesterday. BPP .      CURRENT PREGNANCY:   A2GDM-  diagnosed at 28 weeks.  Pt is on 6U on NPH at night. She has been in good control. Growth US at 36 weeks howed baby at 6#3oz.   AMA- MT21 negative  Low amniotic fluid - JULIANNA 4.4 on BPP yesterday but two MVPs at 2cm.   Low weight gain this pregnancy- 10lbs.  Pt gained 14lbs with last pregnancy and baby was 8lbs at delivery.   Anemia   Uterine Fibroid- 5cm on dating US.  8cm on US at 36 weeks. Intramural, right, palpable.     FIRST STAGE- Pt was 3/70%/-2 on admission. Pitocin was started per protocol. At 5.5/80/-2 pt received epidural anesthesia. AROM at 1512, clear fluid.  At that time pt was 6/80/-1. Pt was completely dilated at 1555. FSBG were all under 110 during active labor.     SECOND STAGE- At 1557, pt achieved  of viable male  in vertex presentation over 2nd degree laceration, Nuchal cord x 1 easily reduced. Apgars 7&9, 8#0z. Baby placed on maternal abdomen and had good flexion but was hesitant to breath. Stimulation at warmer facilitated increased respiratory effort and baby then returned back to Mother.     THIRD STAGE- Placenta delivered spontneously and intact 3 vessel cord at 1606.  2nd degree laceration repaired with 3.0 Chromic x 2. EBL 150ml.     A/P    A2GDM - check FSBG two hours after delivery and tomorrow morning.   Postpartum care per protocol.     Nicole Henry CNM

## 2021-06-22 NOTE — ANESTHESIA PROCEDURE NOTES
Epidural catheter Procedure Note  Pre-Procedure   Staff -        Anesthesiologist:  Ginger Fritz MD       Performed By: anesthesiologist       Location: OB       Pre-Anesthestic Checklist: patient identified, IV checked, risks and benefits discussed, informed consent, monitors and equipment checked, pre-op evaluation and at physician/surgeon's request  Timeout:       Correct Patient: Yes        Correct Procedure: Yes        Correct Site: Yes        Correct Position: Yes   Procedure Documentation  Procedure: epidural catheter       Diagnosis: Labor Pain       Patient Position: sitting       Patient Prep/Sterile Barriers: sterile gloves, mask, patient draped, x3       Skin prep: Betadine      Local skin infiltrated with mL of 1% lidocaine.        Insertion Site: L3-4. (midline approach).       Technique: LORT saline        COOPER at 5 cm.       Needle Type: Lenco Mobiley needle       Needle Gauge: 17.        Needle Length (Inches): 5        Catheter: 19 G.         Catheter threaded easily.         3 cm epidural space.         Threaded 8.5 cm at skin.        # of attempts: 1 and  # of redirects:     Assessment/Narrative         Paresthesias: No.      Test dose of 3 mL lidocaine 1.5% w/ 1:200,000 epinephrine at.         Test dose negative, 3 minutes after injection, for signs of intravascular, subdural, or intrathecal injection.       Insertion/Infusion Method: LORT saline       Aspiration negative for Heme or CSF via Epidural Catheter.    Medication(s) Administered   0.2% Ropivacaine (Epidural), 10 mL  Fentanyl PF (Epidural), 100 mcg  Medication Administration Time: 6/22/2021 12:30 PM    Comments:  Catheter secured with adhesive spray, tegaderm, and tape.

## 2021-06-22 NOTE — PROGRESS NOTES
"OB Progress Note  2021  3:14 PM    S:  Pt comfortable with epidural.  No other complaints.      O:  /54   Pulse 60   Temp 98.2  F (36.8  C) (Temporal)   Resp 16   Ht 1.702 m (5' 7\")   Wt (!) 7.258 kg (16 lb)   SpO2 99%   BMI 2.51 kg/m    EFM: Category1  [place]:  Ctx q2-3min  SVE:  6/80%/-1  Membranes: AROM, clear fluid. Fetus tolerated procedure well.  FSBGs all below 110.      Pitocin at 14 mU/min    A/P:  40 year old  @39w0d admitted for IOL for A2GDM   1.  Routine cares  2.  Anticipate     ADRIA Hawkins CNM    "

## 2021-06-22 NOTE — H&P
[facility] Labor and Delivery History and Physical    Juanita Quiroga MRN# [id number]   Age: 40 year old YOB: 1980     Date of Admission:  2021    Primary care provider: Nicole Henry           Chief Complaint:   Juanita Quiroga is a 40 year old female who is 39w0d pregnant today and being admitted for induction of labor, indication A2GDM.  Vertex on US yesterday. BPP .          Pregnancy history:     OBSTETRIC HISTORY:  x 2, GDM in second pregnancy.     CURRENT PREGNANCY:   A2GDM-  diagnosed at 28 weeks.  Pt is on 6U on NPH at night. She has been in good control. Growth US at 36 weeks howed baby at 6#3oz.   AMA- MT21 negative  Low amniotic fluid - JULIANNA 4.4 on BPP yesterday but two MVPs at 2cm.   Low weight gain this pregnancy- 10lbs.  Pt gained 14lbs with last pregnancy and baby was 8lbs at delivery.   Anemia   Uterine Fibroid- 5cm on dating US.  8cm on US at 36 weeks. Intramural, right, palpable.       OB History    Para Term  AB Living   3 2 2 0 0 2   SAB TAB Ectopic Multiple Live Births   0 0 0 0 2      # Outcome Date GA Lbr [place]/2nd Weight Sex Delivery Anes PTL Lv   3 Current            2 Term 18 38w4d 01:25 / 00:07 3.629 kg (8 lb) F Vag-Spont EPI N SENA      Name: DOWNING,BABY1 JUANITA      Apgar1: 9  Apgar5: 9   1 Term 16 40w1d / 03:10 4.099 kg (9 lb 0.6 oz) F Vag-Spont EPI  SENA      Name: DOWNING,BABY1 JUANITA      Apgar1: 9  Apgar5: 9       EDC: Estimated Date of Delivery: 2021    Prenatal Labs:   Lab Results   Component Value Date    ABO A 2018    RH Pos 2018    AS Neg 2017    HEPBANG Negative 2017    TREPAB Negative 2017    HGB 10.1 (L) 2018       GBS Status:   Lab Results   Component Value Date    GBS Negative 2018       Active Problem List  Patient Active Problem List   Diagnosis     Supervision of normal subsequent pregnancy     Indication for care in labor or delivery     A2GDM     YOMIA      Labor and delivery, indication for care       Medication Prior to Admission  Medications Prior to Admission   Medication Sig Dispense Refill Last Dose     acetaminophen (TYLENOL) 325 MG tablet Take 2 tablets (650 mg) by mouth every 4 hours as needed for mild pain or fever (greater than or equal to 38  C /100.4  F (oral) or 38.5  C/ 101.4  F (core).) 100 tablet       acetone, Urine, test STRP Test daily. When negative for 1 week can reduce testing to once weekly 25 each 1      blood glucose monitoring (DAMARIS CONTOUR NEXT USB MONITOR) meter device kit Use to test blood sugar 4 times daily or as directed.  Ok to substitute alternative if insurance prefers. 1 kit 0      blood glucose monitoring (DAMARIS MICROLET) lancets Use to test blood sugar 4 times daily or as directed.  Ok to substitute alternative if insurance prefers. 100 each 3      calcium carbonate (TUMS) 500 MG chewable tablet Take 1 chew tab by mouth 3 times daily        ferrous sulfate (SLO-FE) 142 (45 FE) MG TBCR Take 1 tablet (142 mg) by mouth daily 30 tablet       ibuprofen (ADVIL/MOTRIN) 200 MG tablet Take 3 tablets (600 mg) by mouth every 6 hours as needed for other (cramping) 100 tablet     .        Maternal Past Medical History:     Past Medical History:   Diagnosis Date     Anemia     pregnancy      Diabetes (H)     Glyburide     Fallopian tube abscess     Right side     Heartburn during pregnancy      Hx: UTI (urinary tract infection)                           Social History:   I have reviewed this patient's social history          Physical Exam:   Vitals were reviewed  /68, T97.4, pulse 81, RR 16  Constitutional:   awake, alert, cooperative, no apparent distress, and appears stated age      Cervix: Deferred. RN will check prior to Pitocin administration    Presentation: Vertex  Fetal Heart Rate Tracing: Category 1  Tocometer: no ctxs.   EFW: 7#12oz                       Assessment:   Juanita Quiroga is a Unknown pregnant female admitted with  induction of labor, indication A2GDM.          Plan:   Admit - see IP orders  Labor induction with Pitocin  Pain medication Epidural PRN    Nicole Henry, ADRIA OWENSM

## 2021-06-22 NOTE — PLAN OF CARE
0810 - Patient admitted to room 231 for elective induction. Monitors applied at this time. Admission database complete & health history taken. Jana Flescher, CNM at bedside & plan of care reviewed. Questions answered at this time.     0830 - IV 18g started without difficulty. Labs drawn.     0857 - Pitocin started per order.     1130 - J. Flescher CNM at bedside, SVE 5/80/-2. Patient rating pain 6 out of 10.     1200 - Patient requesting epidural at this time. LR bolus started.     1208 - called provider for placement. Patient positioned sitting at edge of bed.     1215 - NATALIE Bill at bedside. Ropivacaine & fentanyl handed off to her at this time. Consent form signed and time out performed at this time.     1330 - Patient comfortable after epidural placement. Denies having any pain at this time. White 16F placed, draining clear yellow urine. BS checks started at 1300 - 82.     1512 - J. Flescher RN at bedside. AROM done at this time. Clear/blooding fluid noted.     1545 - Patient called RN into room, feeling some pressure. Checked and lip/rim. J. Flescher called to come for delivery. Room set up for delivery.    1555 - Patient complete, pushing started.     1557 -  viable baby boy. Infant admitted to  nursery. See delivery summary for details.

## 2021-06-23 VITALS
SYSTOLIC BLOOD PRESSURE: 112 MMHG | WEIGHT: 166 LBS | OXYGEN SATURATION: 99 % | TEMPERATURE: 97.9 F | DIASTOLIC BLOOD PRESSURE: 74 MMHG | RESPIRATION RATE: 16 BRPM | HEIGHT: 67 IN | BODY MASS INDEX: 26.06 KG/M2 | HEART RATE: 62 BPM

## 2021-06-23 LAB — GLUCOSE BLDC GLUCOMTR-MCNC: 79 MG/DL (ref 70–99)

## 2021-06-23 PROCEDURE — 250N000013 HC RX MED GY IP 250 OP 250 PS 637: Performed by: REGISTERED NURSE

## 2021-06-23 PROCEDURE — 999N001017 HC STATISTIC GLUCOSE BY METER IP

## 2021-06-23 RX ADMIN — IBUPROFEN 800 MG: 400 TABLET ORAL at 06:44

## 2021-06-23 RX ADMIN — ACETAMINOPHEN 650 MG: 325 TABLET, FILM COATED ORAL at 12:58

## 2021-06-23 RX ADMIN — IBUPROFEN 800 MG: 400 TABLET ORAL at 01:02

## 2021-06-23 RX ADMIN — ACETAMINOPHEN 650 MG: 325 TABLET, FILM COATED ORAL at 19:15

## 2021-06-23 RX ADMIN — ACETAMINOPHEN 650 MG: 325 TABLET, FILM COATED ORAL at 06:44

## 2021-06-23 RX ADMIN — SENNOSIDES AND DOCUSATE SODIUM 1 TABLET: 8.6; 5 TABLET ORAL at 12:59

## 2021-06-23 RX ADMIN — IBUPROFEN 800 MG: 400 TABLET ORAL at 12:59

## 2021-06-23 RX ADMIN — ACETAMINOPHEN 650 MG: 325 TABLET, FILM COATED ORAL at 01:02

## 2021-06-23 RX ADMIN — IBUPROFEN 800 MG: 400 TABLET ORAL at 19:16

## 2021-06-23 NOTE — PLAN OF CARE
Vital signs are WNL and fundus is firm and at the umbilicus.  She has a large fibroid which can be felt when checking fundus.  She is no longer a fall risk and is voiding easily.  Pain controlled with oral pain medication.  She and spouse are working on infant cares and breastfeeding.  All questions answered.

## 2021-06-23 NOTE — PROGRESS NOTES
"OB Post-partum Note  PPD# 1    S:  Patient doing well.  Pain controlled.  Voiding.  Bleeding scant.  Breast feeding well.  Ready to go home, good family support.  Hx of uterine fibroid, denies pain at this time    O:  /68   Pulse 62   Temp 97.9  F (36.6  C) (Oral)   Resp 16   Ht 1.702 m (5' 7\")   Wt 75.3 kg (166 lb)   SpO2 99%   Breastfeeding Unknown   BMI 26.00 kg/m    Gen- A&O, NAD  Abd- Non-tender, fundus firm at umbilicus  Ext- non-tender, neg edema    Hemoglobin   Date Value Ref Range Status   2021 12.5 11.7 - 15.7 g/dL Final     A pos  Rubella Immune    A/P: 40 year old  PPD# 1 s/p     1.  Post partum teaching/warnings/precautions reviewed  2.  Re evaluate fibroid at 6 week post partum visit.  2.  Discharge home today    Cassia Nazario CNM  2021  8:38 AM  "

## 2021-06-23 NOTE — LACTATION NOTE
"Initial visit with Juanita, SANDRA, and baby boy. This is Juanita's second baby, she shares breast feeding went well with her first and it seems to be off to a great start with this infant. Juanita shares she is tender, provided sore nipple shells and lanolin.       Highlighted breast feeding section in our \"Guide to Postpartum and  Care.\" Emphasized importance of skin to skin for enhancing early breastfeeding success!    Discussed  breastfeeding basics:   1) Watch for early feeding cues (licking lips, stirring or rooting, sucking movement with mouth, hands to mouth)  2) Feed infant on demand, a minimum of 8 times in 24 hours (recommended waking infant if it's been 3 hours since last feeding)  3) Techniques to waking a sleepy baby to nurse: (undress infant, change diaper if necessary, gently stroking bottom of feet and back, snuggling infant skin to skin, expressing colostrum).      Parents educated to \"typical\"  feeding patterns/behavior: Day 1 sleepiness (birthday nap) through cluster-feeding on day (night) 2. Educated on nutritive vs non-nutritive suckling patterns. Showed how to record infant feedings along with voids and stools in the provided feeding log.     Discussed physiology of milk production from colostrum through milk coming in and how the breasts should begin to feel \"heavy or full\" between day 3-5. Encouraged to review the provided \"Guide to Postpartum and  Care\" handbook for topics that include engorgement, plugged milk ducts, mastitis, safe sleep, and safety of baby. Discussed normal infant weight loss and when infant should be back to birth weight.     Juanita has a new breast pump for home use.    Appreciative of visit.    Ivy Howard RN, IBCLC            "

## 2021-06-23 NOTE — ANESTHESIA POSTPROCEDURE EVALUATION
Patient: Juanita Quiroga    * No procedures listed *    Diagnosis:* No pre-op diagnosis entered *  Diagnosis Additional Information: No value filed.    Anesthesia Type:  Epidural    Note:  Disposition: Inpatient   Postop Pain Control: Uneventful            Sign Out: Well controlled pain   PONV: No   Neuro/Psych: Uneventful            Sign Out: Acceptable/Baseline neuro status   Airway/Respiratory: Uneventful            Sign Out: Acceptable/Baseline resp. status   CV/Hemodynamics: Uneventful            Sign Out: Acceptable CV status   Other NRE: NONE   DID A NON-ROUTINE EVENT OCCUR? No    Event details/Postop Comments:  Patient's epidural worked well for labor pain control. She is ambulating and has no bowel/bladder dysfunction. No paresthesia.  Minor/Insignificant insertion site pain.            Last vitals:  Vitals:    06/23/21 0102 06/23/21 0900 06/23/21 1558   BP: 102/68 100/64 112/74   Pulse: 62 60 62   Resp: 16 16 16   Temp: 36.6  C (97.9  F) 36.7  C (98  F) 36.6  C (97.9  F)   SpO2:          Last vitals prior to Anesthesia Care Transfer:      Electronically Signed By: Fady Goins MD  June 23, 2021  5:24 PM  
ambulatory

## 2021-06-23 NOTE — PLAN OF CARE
Vital signs and assessments wnl. Patient is up indep, voiding , pain well controlled with tylenol and ibuprofen given as prescribed. Encouraged to continue to ambulate as able and void frequently. Patient is bonding well with infant.

## 2021-06-23 NOTE — PLAN OF CARE
VSS. Voiding without difficulty. Scant vaginal bleeding. Pain controlled with ibuprofen and tylenol. Breastfeeding infant on demand, reminded to feed atleast every 3hrs. Fasting OT 79 this am. Will continue to monitor.

## 2021-06-23 NOTE — PLAN OF CARE
Data: Juanita Quiroga transferred to [facility] via wheelchair at 1845. Baby transferred via parent's arms.  Action: Receiving unit notified of transfer: Yes. Patient and family notified of room change. Report given to Faiza DÍAZ at 1845. Belongings sent to receiving unit. Accompanied by Registered Nurse. Oriented patient to surroundings. Call light within reach. ID bands double-checked with receiving RN.  Response: Patient tolerated transfer and is stable.

## 2021-06-23 NOTE — PLAN OF CARE
Patient arrived on floor at 1900 accompanied by spouse and holding infant.  Report given by Mely LOCKHART RN from labor/recovery.  Nurse reviewed safety precautions for mother and infant and Covid restrictions.  Nurse also highlighted the breastfeeding log in the Post Partum book.  All questions answered.

## 2021-06-24 NOTE — PLAN OF CARE
Pt discharged at 2025. Ambulated to door six without difficulty. No questions or concerns noted at this time.

## 2021-08-14 ENCOUNTER — HEALTH MAINTENANCE LETTER (OUTPATIENT)
Age: 41
End: 2021-08-14

## 2021-10-09 ENCOUNTER — HEALTH MAINTENANCE LETTER (OUTPATIENT)
Age: 41
End: 2021-10-09

## 2022-09-11 ENCOUNTER — HEALTH MAINTENANCE LETTER (OUTPATIENT)
Age: 42
End: 2022-09-11

## 2023-02-06 ENCOUNTER — LAB REQUISITION (OUTPATIENT)
Dept: LAB | Facility: CLINIC | Age: 43
End: 2023-02-06

## 2023-02-06 DIAGNOSIS — Z86.32 PERSONAL HISTORY OF GESTATIONAL DIABETES: ICD-10-CM

## 2023-02-06 DIAGNOSIS — Z13.220 ENCOUNTER FOR SCREENING FOR LIPOID DISORDERS: ICD-10-CM

## 2023-02-06 LAB
CHOLEST SERPL-MCNC: 211 MG/DL
HBA1C MFR BLD: 5.6 %
HDLC SERPL-MCNC: 46 MG/DL
LDLC SERPL CALC-MCNC: 141 MG/DL
NONHDLC SERPL-MCNC: 165 MG/DL
TRIGL SERPL-MCNC: 118 MG/DL

## 2023-02-06 PROCEDURE — 83036 HEMOGLOBIN GLYCOSYLATED A1C: CPT | Performed by: OBSTETRICS & GYNECOLOGY

## 2023-02-06 PROCEDURE — 80061 LIPID PANEL: CPT | Performed by: OBSTETRICS & GYNECOLOGY

## 2023-09-14 ENCOUNTER — LAB REQUISITION (OUTPATIENT)
Dept: LAB | Facility: CLINIC | Age: 43
End: 2023-09-14

## 2023-09-14 ENCOUNTER — MEDICAL CORRESPONDENCE (OUTPATIENT)
Dept: HEALTH INFORMATION MANAGEMENT | Facility: CLINIC | Age: 43
End: 2023-09-14
Payer: COMMERCIAL

## 2023-09-14 DIAGNOSIS — M25.59 PAIN IN OTHER SPECIFIED JOINT: ICD-10-CM

## 2023-09-14 LAB
CRP SERPL-MCNC: <3 MG/L
ERYTHROCYTE [DISTWIDTH] IN BLOOD BY AUTOMATED COUNT: 11.9 % (ref 10–15)
ERYTHROCYTE [SEDIMENTATION RATE] IN BLOOD BY WESTERGREN METHOD: 12 MM/HR (ref 0–20)
HCT VFR BLD AUTO: 43.2 % (ref 35–47)
HGB BLD-MCNC: 14.6 G/DL (ref 11.7–15.7)
MCH RBC QN AUTO: 30.8 PG (ref 26.5–33)
MCHC RBC AUTO-ENTMCNC: 33.8 G/DL (ref 31.5–36.5)
MCV RBC AUTO: 91 FL (ref 78–100)
PLATELET # BLD AUTO: 237 10E3/UL (ref 150–450)
RBC # BLD AUTO: 4.74 10E6/UL (ref 3.8–5.2)
WBC # BLD AUTO: 8.2 10E3/UL (ref 4–11)

## 2023-09-14 PROCEDURE — 85652 RBC SED RATE AUTOMATED: CPT | Performed by: OBSTETRICS & GYNECOLOGY

## 2023-09-14 PROCEDURE — 85027 COMPLETE CBC AUTOMATED: CPT | Performed by: OBSTETRICS & GYNECOLOGY

## 2023-09-14 PROCEDURE — 86140 C-REACTIVE PROTEIN: CPT | Performed by: OBSTETRICS & GYNECOLOGY

## 2023-10-07 ENCOUNTER — HEALTH MAINTENANCE LETTER (OUTPATIENT)
Age: 43
End: 2023-10-07

## 2024-02-12 ENCOUNTER — LAB REQUISITION (OUTPATIENT)
Dept: LAB | Facility: CLINIC | Age: 44
End: 2024-02-12
Payer: COMMERCIAL

## 2024-02-12 DIAGNOSIS — R63.8 OTHER SYMPTOMS AND SIGNS CONCERNING FOOD AND FLUID INTAKE: ICD-10-CM

## 2024-02-12 DIAGNOSIS — Z86.32 PERSONAL HISTORY OF GESTATIONAL DIABETES: ICD-10-CM

## 2024-02-12 LAB — HBA1C MFR BLD: 5.3 %

## 2024-02-12 PROCEDURE — 84443 ASSAY THYROID STIM HORMONE: CPT | Performed by: OBSTETRICS & GYNECOLOGY

## 2024-02-12 PROCEDURE — 83036 HEMOGLOBIN GLYCOSYLATED A1C: CPT | Performed by: OBSTETRICS & GYNECOLOGY

## 2024-02-13 LAB — TSH SERPL DL<=0.005 MIU/L-ACNC: 1.66 UIU/ML (ref 0.3–4.2)

## 2024-02-24 ENCOUNTER — HEALTH MAINTENANCE LETTER (OUTPATIENT)
Age: 44
End: 2024-02-24

## 2024-11-18 ENCOUNTER — LAB REQUISITION (OUTPATIENT)
Dept: LAB | Facility: CLINIC | Age: 44
End: 2024-11-18

## 2024-11-18 DIAGNOSIS — Z13.9 ENCOUNTER FOR SCREENING, UNSPECIFIED: ICD-10-CM

## 2024-11-18 LAB
ALBUMIN SERPL BCG-MCNC: 4.3 G/DL (ref 3.5–5.2)
ALP SERPL-CCNC: 40 U/L (ref 40–150)
ALT SERPL W P-5'-P-CCNC: 11 U/L (ref 0–50)
ANION GAP SERPL CALCULATED.3IONS-SCNC: 12 MMOL/L (ref 7–15)
AST SERPL W P-5'-P-CCNC: 23 U/L (ref 0–45)
BASOPHILS # BLD AUTO: 0 10E3/UL (ref 0–0.2)
BASOPHILS NFR BLD AUTO: 1 %
BILIRUB SERPL-MCNC: 1.1 MG/DL
BUN SERPL-MCNC: 10.5 MG/DL (ref 6–20)
CALCIUM SERPL-MCNC: 9.5 MG/DL (ref 8.8–10.4)
CHLORIDE SERPL-SCNC: 101 MMOL/L (ref 98–107)
CREAT SERPL-MCNC: 0.76 MG/DL (ref 0.51–0.95)
EGFRCR SERPLBLD CKD-EPI 2021: >90 ML/MIN/1.73M2
EOSINOPHIL # BLD AUTO: 0.1 10E3/UL (ref 0–0.7)
EOSINOPHIL NFR BLD AUTO: 2 %
ERYTHROCYTE [DISTWIDTH] IN BLOOD BY AUTOMATED COUNT: 12 % (ref 10–15)
GLUCOSE SERPL-MCNC: 103 MG/DL (ref 70–99)
HCO3 SERPL-SCNC: 25 MMOL/L (ref 22–29)
HCT VFR BLD AUTO: 41.6 % (ref 35–47)
HGB BLD-MCNC: 13.8 G/DL (ref 11.7–15.7)
IMM GRANULOCYTES # BLD: 0 10E3/UL
IMM GRANULOCYTES NFR BLD: 0 %
LYMPHOCYTES # BLD AUTO: 2.3 10E3/UL (ref 0.8–5.3)
LYMPHOCYTES NFR BLD AUTO: 38 %
MCH RBC QN AUTO: 30.4 PG (ref 26.5–33)
MCHC RBC AUTO-ENTMCNC: 33.2 G/DL (ref 31.5–36.5)
MCV RBC AUTO: 92 FL (ref 78–100)
MONOCYTES # BLD AUTO: 0.4 10E3/UL (ref 0–1.3)
MONOCYTES NFR BLD AUTO: 7 %
NEUTROPHILS # BLD AUTO: 3.3 10E3/UL (ref 1.6–8.3)
NEUTROPHILS NFR BLD AUTO: 53 %
NRBC # BLD AUTO: 0 10E3/UL
NRBC BLD AUTO-RTO: 0 /100
PLATELET # BLD AUTO: 208 10E3/UL (ref 150–450)
POTASSIUM SERPL-SCNC: 3.9 MMOL/L (ref 3.4–5.3)
PROT SERPL-MCNC: 6.9 G/DL (ref 6.4–8.3)
RBC # BLD AUTO: 4.54 10E6/UL (ref 3.8–5.2)
SODIUM SERPL-SCNC: 138 MMOL/L (ref 135–145)
WBC # BLD AUTO: 6.2 10E3/UL (ref 4–11)

## 2024-11-18 PROCEDURE — 82310 ASSAY OF CALCIUM: CPT | Performed by: OBSTETRICS & GYNECOLOGY

## 2024-11-18 PROCEDURE — 85041 AUTOMATED RBC COUNT: CPT | Performed by: OBSTETRICS & GYNECOLOGY

## 2024-11-18 PROCEDURE — 85004 AUTOMATED DIFF WBC COUNT: CPT | Performed by: OBSTETRICS & GYNECOLOGY

## 2024-11-18 PROCEDURE — 85018 HEMOGLOBIN: CPT | Performed by: OBSTETRICS & GYNECOLOGY

## 2024-12-02 ENCOUNTER — LAB REQUISITION (OUTPATIENT)
Dept: LAB | Facility: CLINIC | Age: 44
End: 2024-12-02

## 2024-12-02 DIAGNOSIS — R73.09 OTHER ABNORMAL GLUCOSE: ICD-10-CM

## 2024-12-02 DIAGNOSIS — R53.83 OTHER FATIGUE: ICD-10-CM

## 2024-12-02 DIAGNOSIS — N92.0 EXCESSIVE AND FREQUENT MENSTRUATION WITH REGULAR CYCLE: ICD-10-CM

## 2024-12-02 LAB
ERYTHROCYTE [DISTWIDTH] IN BLOOD BY AUTOMATED COUNT: 11.9 % (ref 10–15)
EST. AVERAGE GLUCOSE BLD GHB EST-MCNC: 103 MG/DL
HBA1C MFR BLD: 5.2 %
HCT VFR BLD AUTO: 42.8 % (ref 35–47)
HGB BLD-MCNC: 14.1 G/DL (ref 11.7–15.7)
MCH RBC QN AUTO: 30.1 PG (ref 26.5–33)
MCHC RBC AUTO-ENTMCNC: 32.9 G/DL (ref 31.5–36.5)
MCV RBC AUTO: 91 FL (ref 78–100)
PLATELET # BLD AUTO: 232 10E3/UL (ref 150–450)
RBC # BLD AUTO: 4.69 10E6/UL (ref 3.8–5.2)
WBC # BLD AUTO: 6.5 10E3/UL (ref 4–11)

## 2024-12-02 PROCEDURE — 83036 HEMOGLOBIN GLYCOSYLATED A1C: CPT | Performed by: OBSTETRICS & GYNECOLOGY

## 2024-12-02 PROCEDURE — 84443 ASSAY THYROID STIM HORMONE: CPT | Performed by: OBSTETRICS & GYNECOLOGY

## 2024-12-02 PROCEDURE — 82728 ASSAY OF FERRITIN: CPT | Performed by: OBSTETRICS & GYNECOLOGY

## 2024-12-02 PROCEDURE — 85027 COMPLETE CBC AUTOMATED: CPT | Performed by: OBSTETRICS & GYNECOLOGY

## 2024-12-02 PROCEDURE — 84466 ASSAY OF TRANSFERRIN: CPT | Performed by: OBSTETRICS & GYNECOLOGY

## 2024-12-02 PROCEDURE — 83540 ASSAY OF IRON: CPT | Performed by: OBSTETRICS & GYNECOLOGY

## 2024-12-03 LAB
FERRITIN SERPL-MCNC: 69 NG/ML (ref 6–175)
IRON SERPL-MCNC: 143 UG/DL (ref 37–145)
TRANSFERRIN SERPL-MCNC: 233 MG/DL (ref 200–360)
TSH SERPL DL<=0.005 MIU/L-ACNC: 2.44 UIU/ML (ref 0.3–4.2)